# Patient Record
Sex: FEMALE | Race: BLACK OR AFRICAN AMERICAN | URBAN - METROPOLITAN AREA
[De-identification: names, ages, dates, MRNs, and addresses within clinical notes are randomized per-mention and may not be internally consistent; named-entity substitution may affect disease eponyms.]

---

## 2020-07-20 ENCOUNTER — OFFICE VISIT (OUTPATIENT)
Dept: URBAN - METROPOLITAN AREA CLINIC 113 | Facility: CLINIC | Age: 75
End: 2020-07-20

## 2020-07-25 ENCOUNTER — TELEPHONE ENCOUNTER (OUTPATIENT)
Dept: URBAN - METROPOLITAN AREA CLINIC 13 | Facility: CLINIC | Age: 75
End: 2020-07-25

## 2020-07-25 RX ORDER — POTASSIUM CHLORIDE 750 MG/1
TAKE 1 TABLET EVERY OTHER DAY TABLET, FILM COATED, EXTENDED RELEASE ORAL
Refills: 0 | OUTPATIENT
Start: 2012-05-30 | End: 2014-01-22

## 2020-07-25 RX ORDER — LINACLOTIDE 145 UG/1
TAKE 1 CAPSULE DAILY EMPTY STOMACH, 30 MINUTES BEFORE BREAKFAST CAPSULE, GELATIN COATED ORAL
Qty: 30 | Refills: 5 | OUTPATIENT
Start: 2016-12-27 | End: 2020-04-15

## 2020-07-25 RX ORDER — DICYCLOMINE HYDROCHLORIDE 10 MG/1
TAKE 1 CAPSULE BY MOUTH EVERY 4-6 HOURS AS NEEDED FOR ABDOMINAL PAIN CAPSULE ORAL
Qty: 60 | Refills: 2 | OUTPATIENT
Start: 2016-12-08 | End: 2020-04-15

## 2020-07-25 RX ORDER — PIOGLITAZONE 15 MG/1
TAKE 1 TABLET ONCE DAILY TABLET ORAL
Refills: 0 | OUTPATIENT
Start: 2020-01-27 | End: 2020-04-15

## 2020-07-25 RX ORDER — POLYETHYLENE GLYCOL 3350, SODIUM CHLORIDE, SODIUM BICARBONATE AND POTASSIUM CHLORIDE WITH LEMON FLAVOR 420; 11.2; 5.72; 1.48 G/4L; G/4L; G/4L; G/4L
TAKE  ML AS DIRECTED MIX CONTENTS PER DIRECTIONS, BEGIN DRINKING 1/2 SOLUTION @ 5P, COMPLETE REMAINDER OF SOLUTION 6HR PRIOR TO PROCEDURE POWDER, FOR SOLUTION ORAL
Qty: 1 | Refills: 0 | OUTPATIENT
Start: 2012-10-30 | End: 2012-11-06

## 2020-07-25 RX ORDER — TRIAMTERENE AND HYDROCHLOROTHIAZIDE 37.5; 25 MG/1; MG/1
TAKE 1 CAPSULE DAILY CAPSULE ORAL
Refills: 0 | OUTPATIENT
Start: 2011-09-27 | End: 2014-01-22

## 2020-07-25 RX ORDER — AMLODIPINE BESYLATE 10 MG/1
TAKE 1 TABLET DAILY TABLET ORAL
Refills: 0 | OUTPATIENT
Start: 2020-03-01 | End: 2020-04-15

## 2020-07-25 RX ORDER — PRAVASTATIN SODIUM 20 MG/1
TAKE 1 TABLET DAILY TABLET ORAL
Refills: 0 | OUTPATIENT
Start: 2020-03-31 | End: 2020-04-15

## 2020-07-25 RX ORDER — WARFARIN 10 MG/1
TAKE 1 TABLET DAILY TABLET ORAL
Refills: 0 | OUTPATIENT
Start: 2011-09-06 | End: 2014-01-22

## 2020-07-25 RX ORDER — CHOLESTYRAMINE 4 G/9G
TAKE 1 PACKET EVERY 12 HOURS PRN DIARRHEA POWDER, FOR SUSPENSION ORAL
Qty: 60 | Refills: 11 | OUTPATIENT
Start: 2016-12-21 | End: 2016-12-27

## 2020-07-25 RX ORDER — SITAGLIPTIN AND METFORMIN HYDROCHLORIDE 50; 500 MG/1; MG/1
TAKE 1 TABLET DAILY WITH FOOD TABLET, FILM COATED ORAL
Refills: 0 | OUTPATIENT
End: 2016-12-08

## 2020-07-25 RX ORDER — ALPRAZOLAM 0.5 MG/1
TAKE 1 TABLET DAILY AS DIRECTED TABLET ORAL
Refills: 0 | OUTPATIENT
Start: 2020-04-08 | End: 2020-04-15

## 2020-07-25 RX ORDER — SUCRALFATE 1 G/10ML
TAKE ML 4 TIMES DAILY SUSPENSION ORAL
Refills: 0 | OUTPATIENT
Start: 2020-04-08 | End: 2020-05-27

## 2020-07-25 RX ORDER — HYOSCYAMINE SULFATE 0.12 MG/1
PLACE 1 TABLET EVERY 4-6 HOURS PRN ABDOMINAL PAIN TABLET, ORALLY DISINTEGRATING ORAL
Qty: 60 | Refills: 2 | OUTPATIENT
Start: 2014-01-22 | End: 2016-12-08

## 2020-07-25 RX ORDER — TRAMADOL HYDROCHLORIDE AND ACETAMINOPHEN 37.5; 325 MG/1; MG/1
TAKE 1 TO 2 TABLETS EVERY 4 TO 6 HOURS AS NEEDED FOR PAIN TABLET, COATED ORAL
Refills: 0 | OUTPATIENT
End: 2016-12-08

## 2020-07-25 RX ORDER — GLIPIZIDE 5 MG
TAKE 1 TABLET TWICE DAILY TABLET ORAL
Refills: 0 | OUTPATIENT
End: 2020-04-15

## 2020-07-25 RX ORDER — POLYETHYLENE GLYCOL 3350, SODIUM SULFATE, SODIUM CHLORIDE, POTASSIUM CHLORIDE, ASCORBIC ACID, SODIUM ASCORBATE 7.5-2.691G
TAKE 32 OZ AS DIRECTED DRINK ONE BTL STARTING @ 6P AND THE SECOND BTL 5 HR PRIOR TO PROCEDURE KIT ORAL
Qty: 1 | Refills: 0 | OUTPATIENT
Start: 2012-10-30 | End: 2012-11-06

## 2020-07-25 RX ORDER — TRIAMTERENE/HYDROCHLOROTHIAZID 37.5-25 MG
TAKE 1 CAPSULE DAILY CAPSULE ORAL
Refills: 0 | OUTPATIENT
End: 2020-05-27

## 2020-07-25 RX ORDER — GLIPIZIDE 5 MG/1
TAKE 1 TABLET TWICE DAILY TABLET ORAL
Qty: 60 | Refills: 0 | OUTPATIENT
Start: 2010-12-14 | End: 2014-01-22

## 2020-07-25 RX ORDER — LOSARTAN POTASSIUM 50 MG/1
TAKE 1 TABLET DAILY TABLET, FILM COATED ORAL
Refills: 0 | OUTPATIENT
Start: 2020-01-24 | End: 2020-04-15

## 2020-07-25 RX ORDER — AMLODIPINE BESYLATE 5 MG/1
TAKE 1 TABLET DAILY TABLET ORAL
Refills: 0 | OUTPATIENT
Start: 2011-08-22 | End: 2014-01-22

## 2020-07-25 RX ORDER — METHOCARBAMOL 750 MG/1
TAKE 1 TABLET TWICE DAILY AS NEEDED TABLET, FILM COATED ORAL
Refills: 0 | OUTPATIENT
End: 2016-12-08

## 2020-07-25 RX ORDER — COLESEVELAM HYDROCHLORIDE 625 MG/1
TAKE  TABLET TWICE DAILY TABLET, FILM COATED ORAL
Qty: 60 | Refills: 3 | OUTPATIENT
Start: 2016-12-12 | End: 2016-12-27

## 2020-07-25 RX ORDER — PANTOPRAZOLE SODIUM 40 MG/1
TAKE 1 TABLET BY MOUTH ONCE DAILY TABLET, DELAYED RELEASE ORAL
Qty: 30 | Refills: 0 | OUTPATIENT
Start: 2014-01-22 | End: 2016-12-08

## 2020-07-26 ENCOUNTER — TELEPHONE ENCOUNTER (OUTPATIENT)
Dept: URBAN - METROPOLITAN AREA CLINIC 13 | Facility: CLINIC | Age: 75
End: 2020-07-26

## 2020-07-26 RX ORDER — WARFARIN 5 MG/1
TABLET ORAL
Qty: 60 | Refills: 0 | Status: ACTIVE | COMMUNITY
Start: 2010-09-03

## 2020-07-26 RX ORDER — AMLODIPINE BESYLATE 5 MG/1
TABLET ORAL
Qty: 30 | Refills: 0 | Status: ACTIVE | COMMUNITY
Start: 2011-08-22

## 2020-07-26 RX ORDER — LOSARTAN POTASSIUM 50 MG/1
TABLET, FILM COATED ORAL
Qty: 90 | Refills: 0 | Status: ACTIVE | COMMUNITY
Start: 2019-03-03

## 2020-07-26 RX ORDER — WARFARIN 5 MG/1
TABLET ORAL
Qty: 60 | Refills: 0 | Status: ACTIVE | COMMUNITY
Start: 2011-09-06

## 2020-07-26 RX ORDER — METHOCARBAMOL 500 MG/1
TAKE 1 TABLET BY MOUTH EVERY 8 HOURS IF NEEDED TABLET, FILM COATED ORAL
Qty: 15 | Refills: 0 | Status: ACTIVE | COMMUNITY
Start: 2018-07-24

## 2020-07-26 RX ORDER — NEOMYCIN SULFATE, POLYMYXIN B SULFATE AND HYDROCORTISONE 10; 3.5; 1 MG/ML; MG/ML; [USP'U]/ML
SUSPENSION/ DROPS AURICULAR (OTIC)
Qty: 8 | Refills: 0 | Status: ACTIVE | COMMUNITY
Start: 2019-01-25

## 2020-07-26 RX ORDER — TIZANIDINE 4 MG/1
TABLET ORAL
Qty: 60 | Refills: 0 | Status: ACTIVE | COMMUNITY
Start: 2020-01-27

## 2020-07-26 RX ORDER — HYDROCODONE BITARTRATE AND ACETAMINOPHEN 10; 325 MG/1; MG/1
TAKE 1 TABLET EVERY 8 HOURS AS NEEDED FOR PAIN TABLET ORAL
Refills: 0 | Status: ACTIVE | COMMUNITY
Start: 2020-06-12

## 2020-07-26 RX ORDER — POTASSIUM CHLORIDE 750 MG/1
TAKE 1 CAPSULE DAILY CAPSULE, EXTENDED RELEASE ORAL
Refills: 0 | Status: ACTIVE | COMMUNITY

## 2020-07-26 RX ORDER — HYDROCODONE BITARTRATE AND ACETAMINOPHEN 10; 325 MG/1; MG/1
TABLET ORAL
Qty: 30 | Refills: 0 | Status: ACTIVE | COMMUNITY
Start: 2020-04-22

## 2020-07-26 RX ORDER — AMLODIPINE BESYLATE 5 MG/1
TABLET ORAL
Refills: 0 | Status: ACTIVE | COMMUNITY
Start: 2012-01-27

## 2020-07-26 RX ORDER — PIOGLITAZONE 15 MG/1
TABLET ORAL
Qty: 90 | Refills: 0 | Status: ACTIVE | COMMUNITY
Start: 2019-03-04

## 2020-07-26 RX ORDER — FERROUS SULFATE 325(65) MG
TAKE 1 TABLET BY MOUTH ONCE DAILY TABLET ORAL
Qty: 30 | Refills: 0 | Status: ACTIVE | COMMUNITY
Start: 2019-04-23

## 2020-07-26 RX ORDER — OXYCODONE AND ACETAMINOPHEN 5; 325 MG/1; MG/1
TAKE 1 TABLET BY MOUTH EVERY 6 HOURS IF NEEDED FOR PAIN TABLET ORAL
Qty: 12 | Refills: 0 | Status: ACTIVE | COMMUNITY
Start: 2019-07-26

## 2020-07-26 RX ORDER — TRIAMTERENE AND HYDROCHLOROTHIAZIDE 37.5; 25 MG/1; MG/1
TABLET ORAL
Qty: 30 | Refills: 0 | Status: ACTIVE | COMMUNITY
Start: 2011-03-22

## 2020-07-26 RX ORDER — ZOLPIDEM TARTRATE 5 MG/1
TAKE 1 TABLET BY MOUTH IF NEEDED FOR SLEEP TABLET, FILM COATED ORAL
Qty: 30 | Refills: 0 | Status: ACTIVE | COMMUNITY
Start: 2012-03-22

## 2020-07-26 RX ORDER — METOPROLOL SUCCINATE 50 MG/1
TAKE 1 TABLET DAILY TABLET, EXTENDED RELEASE ORAL
Refills: 0 | Status: ACTIVE | COMMUNITY

## 2020-07-26 RX ORDER — OMEPRAZOLE 20 MG/1
CAPSULE, DELAYED RELEASE ORAL
Qty: 30 | Refills: 0 | Status: ACTIVE | COMMUNITY
Start: 2011-01-13

## 2020-07-26 RX ORDER — TRAMADOL HYDROCHLORIDE AND ACETAMINOPHEN 37.5; 325 MG/1; MG/1
TABLET, FILM COATED ORAL
Qty: 90 | Refills: 0 | Status: ACTIVE | COMMUNITY
Start: 2011-01-13

## 2020-07-26 RX ORDER — CLOTRIMAZOLE AND BETAMETHASONE DIPROPIONATE 10; .5 MG/G; MG/G
APPLY TO AFFECTED AREA ONCE DAILY FOR 7 DAYS AS DIRECTED CREAM TOPICAL
Qty: 15 | Refills: 0 | Status: ACTIVE | COMMUNITY
Start: 2012-07-19

## 2020-07-26 RX ORDER — POTASSIUM CHLORIDE 750 MG/1
TABLET, EXTENDED RELEASE ORAL
Qty: 60 | Refills: 0 | Status: ACTIVE | COMMUNITY
Start: 2011-07-06

## 2020-07-26 RX ORDER — CIPROFLOXACIN HYDROCHLORIDE 500 MG/1
TAKE 1 TABLET BY MOUTH TWICE A DAY TABLET, FILM COATED ORAL
Qty: 20 | Refills: 0 | Status: ACTIVE | COMMUNITY
Start: 2018-12-13

## 2020-07-26 RX ORDER — AMOXICILLIN AND CLAVULANATE POTASSIUM 875; 125 MG/1; MG/1
TAKE 1 TABLET BY MOUTH TWICE A DAY FOR 10 DAYS TABLET, FILM COATED ORAL
Qty: 20 | Refills: 0 | Status: ACTIVE | COMMUNITY
Start: 2012-09-17

## 2020-07-26 RX ORDER — METRONIDAZOLE 500 MG/1
TAKE 1 TABLET BY MOUTH TWICE A DAY TABLET ORAL
Qty: 20 | Refills: 0 | Status: ACTIVE | COMMUNITY
Start: 2018-12-13

## 2020-07-26 RX ORDER — ONDANSETRON HYDROCHLORIDE 4 MG/1
TABLET, FILM COATED ORAL
Qty: 60 | Refills: 0 | Status: ACTIVE | COMMUNITY
Start: 2018-01-15

## 2020-07-26 RX ORDER — OSELTAMIVIR PHOSPHATE 75 MG/1
CAPSULE ORAL
Qty: 10 | Refills: 0 | Status: ACTIVE | COMMUNITY
Start: 2020-02-01

## 2020-07-26 RX ORDER — HYDROCODONE BITARTRATE AND ACETAMINOPHEN 10; 325 MG/1; MG/1
TABLET ORAL
Qty: 21 | Refills: 0 | Status: ACTIVE | COMMUNITY
Start: 2019-04-12

## 2020-07-26 RX ORDER — OSELTAMIVIR PHOSPHATE 75 MG/1
CAPSULE ORAL
Qty: 10 | Refills: 0 | Status: ACTIVE | COMMUNITY
Start: 2020-03-01

## 2020-07-26 RX ORDER — TRIAMTERENE AND HYDROCHLOROTHIAZIDE 37.5; 25 MG/1; MG/1
CAPSULE ORAL
Qty: 30 | Refills: 0 | Status: ACTIVE | COMMUNITY
Start: 2011-09-27

## 2020-07-26 RX ORDER — FLUCONAZOLE 150 MG/1
TABLET ORAL
Qty: 3 | Refills: 0 | Status: ACTIVE | COMMUNITY
Start: 2020-03-01

## 2020-07-26 RX ORDER — HYDROCHLOROTHIAZIDE 25 MG/1
TAKE 1 TABLET DAILY TABLET ORAL
Refills: 0 | Status: ACTIVE | COMMUNITY
Start: 2020-06-12

## 2020-07-26 RX ORDER — IRON/C/B12/CALCIU/STOMACH CONC 70-150-10
TABLET ORAL
Qty: 30 | Refills: 0 | Status: ACTIVE | COMMUNITY
Start: 2018-10-24

## 2020-07-26 RX ORDER — CHLORHEXIDINE GLUCONATE 4 %
TAKE 1 TABLET BY MOUTH DAILY LIQUID (ML) TOPICAL
Qty: 30 | Refills: 0 | Status: ACTIVE | COMMUNITY
Start: 2019-04-23

## 2020-07-26 RX ORDER — AMLODIPINE BESYLATE 5 MG
TAKE 1 TABLET DAILY TABLET ORAL
Refills: 0 | Status: ACTIVE | COMMUNITY

## 2020-07-26 RX ORDER — AZITHROMYCIN DIHYDRATE 250 MG/1
TABLET, FILM COATED ORAL
Qty: 6 | Refills: 0 | Status: ACTIVE | COMMUNITY
Start: 2020-04-05

## 2020-07-26 RX ORDER — ZOLPIDEM TARTRATE 10 MG/1
TAKE 1 TABLET BY MOUTH AT BEDTIME IF NEEDED FOR SLEEP TABLET, FILM COATED ORAL
Qty: 30 | Refills: 0 | Status: ACTIVE | COMMUNITY
Start: 2011-07-01

## 2020-07-26 RX ORDER — LEVETIRACETAM 500 MG/1
TAKE 1 TABLET BY MOUTH TWICE A DAY TABLET ORAL
Qty: 60 | Refills: 0 | Status: ACTIVE | COMMUNITY
Start: 2019-11-25

## 2020-07-26 RX ORDER — TIZANIDINE 4 MG/1
TAKE 1-2 TABLET BY MOUTH AS NEEDED FOR CRAMPS/ PAIN MAX TWICE DAILY TABLET ORAL
Qty: 60 | Refills: 0 | Status: ACTIVE | COMMUNITY
Start: 2019-11-25

## 2020-07-26 RX ORDER — ZOLPIDEM TARTRATE 10 MG/1
TAKE 1 TABLET BY MOUTH AT BEDTIME AS NEEDED FOR SLEEP TABLET, FILM COATED ORAL
Qty: 30 | Refills: 0 | Status: ACTIVE | COMMUNITY
Start: 2011-12-02

## 2020-07-26 RX ORDER — AZITHROMYCIN DIHYDRATE 500 MG/1
TAKE 4 TABLETS AS 1 DOSE TABLET, FILM COATED ORAL
Qty: 4 | Refills: 0 | Status: ACTIVE | COMMUNITY
Start: 2011-10-05

## 2020-07-26 RX ORDER — AMLODIPINE BESYLATE 10 MG/1
TABLET ORAL
Qty: 30 | Refills: 0 | Status: ACTIVE | COMMUNITY
Start: 2019-03-03

## 2020-07-26 RX ORDER — PREDNISONE 20 MG/1
TAKE 40MG BY MOUTH DAILY FOR 2 DAYS, THEN 20MG DAILY FOR 2 DAYS, THEN TABLET ORAL
Qty: 7 | Refills: 0 | Status: ACTIVE | COMMUNITY
Start: 2018-07-14

## 2020-07-26 RX ORDER — HYDROCODONE BITARTRATE AND ACETAMINOPHEN 10; 325 MG/1; MG/1
TABLET ORAL
Qty: 20 | Refills: 0 | Status: ACTIVE | COMMUNITY
Start: 2020-03-22

## 2020-07-26 RX ORDER — TIZANIDINE 4 MG/1
TABLET ORAL
Qty: 60 | Refills: 0 | Status: ACTIVE | COMMUNITY
Start: 2020-04-16

## 2020-07-26 RX ORDER — METHYLPREDNISOLONE 4 MG/1
TABLET ORAL
Qty: 21 | Refills: 0 | Status: ACTIVE | COMMUNITY
Start: 2020-02-01

## 2020-07-26 RX ORDER — DICLOFENAC SODIUM 1 %
APPLY 2 GRAMS TO AFFECTED AREA THREE TIMES A DAY GEL (GRAM) TOPICAL
Qty: 255 | Refills: 0 | Status: ACTIVE | COMMUNITY
Start: 2012-05-29

## 2020-07-26 RX ORDER — HYDROCODONE BITARTRATE AND ACETAMINOPHEN 10; 325 MG/1; MG/1
TK 1 T PO  PRF SEVERE P MAX BID TABLET ORAL
Qty: 20 | Refills: 0 | Status: ACTIVE | COMMUNITY
Start: 2020-03-16

## 2020-07-26 RX ORDER — AMLODIPINE BESYLATE 5 MG/1
TABLET ORAL
Qty: 30 | Refills: 0 | Status: ACTIVE | COMMUNITY
Start: 2011-01-10

## 2020-07-26 RX ORDER — CETIRIZINE HYDROCHLORIDE, PSEUDOEPHEDRINE HYDROCHLORIDE 5; 120 MG/1; MG/1
TAKE 2 TABLETS BY MOUTH TWICE A DAY TABLET, FILM COATED, EXTENDED RELEASE ORAL
Qty: 60 | Refills: 0 | Status: ACTIVE | COMMUNITY
Start: 2019-12-16

## 2020-07-26 RX ORDER — ALPRAZOLAM 0.5 MG/1
TABLET ORAL
Qty: 40 | Refills: 0 | Status: ACTIVE | COMMUNITY
Start: 2019-03-04

## 2020-07-26 RX ORDER — FLUCONAZOLE 150 MG/1
TAKE 1 TABLET BY MOUTH FOR INFECTION WAIT THREE DAYS MAY REPEAT TWO MO TABLET ORAL
Qty: 3 | Refills: 0 | Status: ACTIVE | COMMUNITY
Start: 2019-12-26

## 2020-07-26 RX ORDER — GLIPIZIDE 5 MG/1
TABLET ORAL
Qty: 60 | Refills: 0 | Status: ACTIVE | COMMUNITY
Start: 2010-12-14

## 2020-07-26 RX ORDER — OSELTAMIVIR PHOSPHATE 75 MG/1
CAPSULE ORAL
Qty: 10 | Refills: 0 | Status: ACTIVE | COMMUNITY
Start: 2019-05-05

## 2020-07-26 RX ORDER — FLUCONAZOLE 100 MG/1
TABLET ORAL
Refills: 0 | Status: ACTIVE | COMMUNITY
Start: 2012-07-19

## 2020-07-26 RX ORDER — HYOSCYAMINE SULFATE 0.12 MG/1
PLACE 1 TABLET EVERY 4-6 HOURS PRN ABDOMINAL PAIN TABLET, ORALLY DISINTEGRATING ORAL
Qty: 60 | Refills: 2 | Status: ACTIVE | COMMUNITY
Start: 2020-04-15

## 2020-07-26 RX ORDER — BROMPHENIRAMINE MALEATE, PSEUDOEPHEDRINE HYDROCHLORIDE, 2; 30; 10 MG/5ML; MG/5ML; MG/5ML
TAKE 5 TO 10 MILLILITERS BY MOUTH THREE TIMES A DAY IF NEEDED SYRUP ORAL
Qty: 180 | Refills: 0 | Status: ACTIVE | COMMUNITY
Start: 2019-05-05

## 2020-07-26 RX ORDER — OXYCODONE AND ACETAMINOPHEN 5; 325 MG/1; MG/1
TAKE 1 TABLET BY MOUTH EVERY 4 HOURS IF NEEDED FOR PAIN TABLET ORAL
Qty: 12 | Refills: 0 | Status: ACTIVE | COMMUNITY
Start: 2018-09-01

## 2020-07-26 RX ORDER — PIOGLITAZONE HCL 15 MG
TAKE 1 TABLET BY MOUTH ONCE DAILY TABLET ORAL
Qty: 30 | Refills: 0 | Status: ACTIVE | COMMUNITY
Start: 2011-07-01

## 2020-07-26 RX ORDER — ENOXAPARIN SODIUM 100 MG/ML
INJECTION SUBCUTANEOUS
Qty: 5 | Refills: 0 | Status: ACTIVE | COMMUNITY
Start: 2012-11-01

## 2020-07-26 RX ORDER — HYOSCYAMINE SULFATE 0.12 MG/1
DISSOLVE 1 TABLET UNDER THE TONGUE EVERY 4 TO 6 HOURS AS NEEDED FOR ABDOMINAL PAIN TABLET, ORALLY DISINTEGRATING ORAL
Qty: 45 | Refills: 2 | Status: ACTIVE | COMMUNITY
Start: 2020-04-15

## 2020-10-27 ENCOUNTER — TELEPHONE ENCOUNTER (OUTPATIENT)
Dept: URBAN - METROPOLITAN AREA CLINIC 113 | Facility: CLINIC | Age: 75
End: 2020-10-27

## 2020-12-02 ENCOUNTER — WEB ENCOUNTER (OUTPATIENT)
Dept: URBAN - METROPOLITAN AREA CLINIC 113 | Facility: CLINIC | Age: 75
End: 2020-12-02

## 2020-12-02 ENCOUNTER — TELEPHONE ENCOUNTER (OUTPATIENT)
Dept: URBAN - METROPOLITAN AREA CLINIC 113 | Facility: CLINIC | Age: 75
End: 2020-12-02

## 2020-12-02 ENCOUNTER — OFFICE VISIT (OUTPATIENT)
Dept: URBAN - METROPOLITAN AREA CLINIC 113 | Facility: CLINIC | Age: 75
End: 2020-12-02
Payer: COMMERCIAL

## 2020-12-02 VITALS
DIASTOLIC BLOOD PRESSURE: 75 MMHG | WEIGHT: 182 LBS | HEART RATE: 67 BPM | SYSTOLIC BLOOD PRESSURE: 148 MMHG | HEIGHT: 67 IN | BODY MASS INDEX: 28.56 KG/M2 | TEMPERATURE: 96.9 F

## 2020-12-02 DIAGNOSIS — K55.20 ANGIODYSPLASIA: ICD-10-CM

## 2020-12-02 DIAGNOSIS — R10.10 UPPER ABDOMINAL PAIN: ICD-10-CM

## 2020-12-02 DIAGNOSIS — K29.50 OTHER CHRONIC GASTRITIS WITHOUT HEMORRHAGE: ICD-10-CM

## 2020-12-02 PROBLEM — 8493009: Status: ACTIVE | Noted: 2020-12-02

## 2020-12-02 PROCEDURE — G8427 DOCREV CUR MEDS BY ELIG CLIN: HCPCS | Performed by: INTERNAL MEDICINE

## 2020-12-02 PROCEDURE — 99214 OFFICE O/P EST MOD 30 MIN: CPT | Performed by: INTERNAL MEDICINE

## 2020-12-02 PROCEDURE — G8417 CALC BMI ABV UP PARAM F/U: HCPCS | Performed by: INTERNAL MEDICINE

## 2020-12-02 PROCEDURE — G9903 PT SCRN TBCO ID AS NON USER: HCPCS | Performed by: INTERNAL MEDICINE

## 2020-12-02 RX ORDER — BROMPHENIRAMINE MALEATE, PSEUDOEPHEDRINE HYDROCHLORIDE, 2; 30; 10 MG/5ML; MG/5ML; MG/5ML
TAKE 5 TO 10 MILLILITERS BY MOUTH THREE TIMES A DAY IF NEEDED SYRUP ORAL
Qty: 180 | Refills: 0 | Status: ON HOLD | COMMUNITY
Start: 2019-05-05

## 2020-12-02 RX ORDER — AMLODIPINE BESYLATE 10 MG/1
TABLET ORAL
Qty: 30 | Refills: 0 | Status: ON HOLD | COMMUNITY
Start: 2019-03-03

## 2020-12-02 RX ORDER — HYDROCHLOROTHIAZIDE 25 MG/1
TAKE 1 TABLET DAILY TABLET ORAL
Refills: 0 | Status: ON HOLD | COMMUNITY
Start: 2020-06-12

## 2020-12-02 RX ORDER — ASPIRIN 81 MG/1
1 TABLET TABLET ORAL ONCE A DAY
Status: ACTIVE | COMMUNITY

## 2020-12-02 RX ORDER — PANTOPRAZOLE SODIUM 40 MG/1
1 TABLET TABLET, DELAYED RELEASE ORAL TWICE A DAY
Status: ACTIVE | COMMUNITY

## 2020-12-02 RX ORDER — ZOLPIDEM TARTRATE 5 MG/1
TAKE 1 TABLET BY MOUTH IF NEEDED FOR SLEEP TABLET, FILM COATED ORAL
Qty: 30 | Refills: 0 | Status: ON HOLD | COMMUNITY
Start: 2012-03-22

## 2020-12-02 RX ORDER — SUCRALFATE 1 G/10ML
10 ML ON AN EMPTY STOMACH PRN SUSPENSION ORAL
Status: ACTIVE | COMMUNITY

## 2020-12-02 RX ORDER — ALPRAZOLAM 0.5 MG/1
TABLET ORAL
Qty: 40 | Refills: 0 | Status: ACTIVE | COMMUNITY
Start: 2019-03-04

## 2020-12-02 RX ORDER — NEOMYCIN SULFATE, POLYMYXIN B SULFATE AND HYDROCORTISONE 10; 3.5; 1 MG/ML; MG/ML; [USP'U]/ML
SUSPENSION/ DROPS AURICULAR (OTIC)
Qty: 8 | Refills: 0 | Status: ON HOLD | COMMUNITY
Start: 2019-01-25

## 2020-12-02 RX ORDER — PREDNISONE 20 MG/1
TAKE 40MG BY MOUTH DAILY FOR 2 DAYS, THEN 20MG DAILY FOR 2 DAYS, THEN TABLET ORAL
Qty: 7 | Refills: 0 | Status: ON HOLD | COMMUNITY
Start: 2018-07-14

## 2020-12-02 RX ORDER — LEVETIRACETAM 500 MG/1
TAKE 1 TABLET BY MOUTH TWICE A DAY TABLET ORAL
Qty: 60 | Refills: 0 | Status: ON HOLD | COMMUNITY
Start: 2019-11-25

## 2020-12-02 RX ORDER — GLIPIZIDE 5 MG/1
TABLET ORAL
Qty: 60 | Refills: 0 | Status: ON HOLD | COMMUNITY
Start: 2010-12-14

## 2020-12-02 RX ORDER — OSELTAMIVIR PHOSPHATE 75 MG/1
CAPSULE ORAL
Qty: 10 | Refills: 0 | Status: ON HOLD | COMMUNITY
Start: 2019-05-05

## 2020-12-02 RX ORDER — WARFARIN 5 MG/1
TABLET ORAL
Qty: 60 | Refills: 0 | Status: ACTIVE | COMMUNITY
Start: 2010-09-03

## 2020-12-02 RX ORDER — TRIAMTERENE AND HYDROCHLOROTHIAZIDE 37.5; 25 MG/1; MG/1
CAPSULE ORAL
Qty: 30 | Refills: 0 | Status: ON HOLD | COMMUNITY
Start: 2011-09-27

## 2020-12-02 RX ORDER — TRAMADOL HYDROCHLORIDE AND ACETAMINOPHEN 37.5; 325 MG/1; MG/1
TABLET, FILM COATED ORAL
Qty: 90 | Refills: 0 | Status: ON HOLD | COMMUNITY
Start: 2011-01-13

## 2020-12-02 RX ORDER — AMLODIPINE BESYLATE 5 MG
TAKE 1 TABLET DAILY TABLET ORAL
Refills: 0 | Status: ON HOLD | COMMUNITY

## 2020-12-02 RX ORDER — PIOGLITAZONE 15 MG/1
TABLET ORAL
Qty: 90 | Refills: 0 | Status: ACTIVE | COMMUNITY
Start: 2019-03-04

## 2020-12-02 RX ORDER — METOPROLOL SUCCINATE 50 MG/1
TAKE 1 TABLET DAILY TABLET, EXTENDED RELEASE ORAL
Refills: 0 | Status: ACTIVE | COMMUNITY

## 2020-12-02 RX ORDER — WARFARIN SODIUM 10 MG/1
1 TABLET TABLET ORAL ONCE A DAY
Status: ACTIVE | COMMUNITY

## 2020-12-02 RX ORDER — METHYLPREDNISOLONE 4 MG/1
TABLET ORAL
Qty: 21 | Refills: 0 | Status: ON HOLD | COMMUNITY
Start: 2020-02-01

## 2020-12-02 RX ORDER — LOSARTAN POTASSIUM 50 MG/1
TABLET, FILM COATED ORAL
Qty: 90 | Refills: 0 | Status: ACTIVE | COMMUNITY
Start: 2019-03-03

## 2020-12-02 RX ORDER — METFORMIN HYDROCHLORIDE 500 MG/1
1 TABLET WITH A MEAL TABLET, FILM COATED ORAL TWICE DAILY
Status: ACTIVE | COMMUNITY

## 2020-12-02 RX ORDER — METHOCARBAMOL 500 MG/1
TAKE 1 TABLET BY MOUTH EVERY 8 HOURS IF NEEDED TABLET, FILM COATED ORAL
Qty: 15 | Refills: 0 | Status: ON HOLD | COMMUNITY
Start: 2018-07-24

## 2020-12-02 RX ORDER — TIZANIDINE 4 MG/1
TAKE 1-2 TABLET BY MOUTH AS NEEDED FOR CRAMPS/ PAIN MAX TWICE DAILY TABLET ORAL
Qty: 60 | Refills: 0 | Status: ACTIVE | COMMUNITY
Start: 2019-11-25

## 2020-12-02 RX ORDER — PIOGLITAZONE HCL 15 MG
TAKE 1 TABLET BY MOUTH ONCE DAILY TABLET ORAL
Qty: 30 | Refills: 0 | Status: ON HOLD | COMMUNITY
Start: 2011-07-01

## 2020-12-02 RX ORDER — TRIAMTERENE AND HYDROCHLOROTHIAZIDE 37.5; 25 MG/1; MG/1
TABLET ORAL
Qty: 30 | Refills: 0 | Status: ON HOLD | COMMUNITY
Start: 2011-03-22

## 2020-12-02 RX ORDER — FLUCONAZOLE 100 MG/1
TABLET ORAL
Refills: 0 | Status: ON HOLD | COMMUNITY
Start: 2012-07-19

## 2020-12-02 RX ORDER — IRON/C/B12/CALCIU/STOMACH CONC 70-150-10
TABLET ORAL
Qty: 30 | Refills: 0 | Status: ON HOLD | COMMUNITY
Start: 2018-10-24

## 2020-12-02 RX ORDER — ONDANSETRON HYDROCHLORIDE 4 MG/1
TABLET, FILM COATED ORAL
Qty: 60 | Refills: 0 | Status: ON HOLD | COMMUNITY
Start: 2018-01-15

## 2020-12-02 RX ORDER — AMOXICILLIN AND CLAVULANATE POTASSIUM 875; 125 MG/1; MG/1
TAKE 1 TABLET BY MOUTH TWICE A DAY FOR 10 DAYS TABLET, FILM COATED ORAL
Qty: 20 | Refills: 0 | Status: ON HOLD | COMMUNITY
Start: 2012-09-17

## 2020-12-02 RX ORDER — OXYCODONE AND ACETAMINOPHEN 5; 325 MG/1; MG/1
TAKE 1 TABLET BY MOUTH EVERY 4 HOURS IF NEEDED FOR PAIN TABLET ORAL
Qty: 12 | Refills: 0 | Status: ON HOLD | COMMUNITY
Start: 2018-09-01

## 2020-12-02 RX ORDER — POTASSIUM CHLORIDE 750 MG/1
TAKE 1 CAPSULE DAILY CAPSULE, EXTENDED RELEASE ORAL
Refills: 0 | Status: ON HOLD | COMMUNITY

## 2020-12-02 RX ORDER — AZITHROMYCIN DIHYDRATE 500 MG/1
TAKE 4 TABLETS AS 1 DOSE TABLET, FILM COATED ORAL
Qty: 4 | Refills: 0 | Status: ON HOLD | COMMUNITY
Start: 2011-10-05

## 2020-12-02 RX ORDER — CHLORHEXIDINE GLUCONATE 4 %
TAKE 1 TABLET BY MOUTH DAILY LIQUID (ML) TOPICAL
Qty: 30 | Refills: 0 | Status: ON HOLD | COMMUNITY
Start: 2019-04-23

## 2020-12-02 RX ORDER — POTASSIUM CHLORIDE 750 MG/1
TABLET, EXTENDED RELEASE ORAL
Qty: 60 | Refills: 0 | Status: ON HOLD | COMMUNITY
Start: 2011-07-06

## 2020-12-02 RX ORDER — OMEPRAZOLE 20 MG/1
CAPSULE, DELAYED RELEASE ORAL
Qty: 30 | Refills: 0 | Status: ON HOLD | COMMUNITY
Start: 2011-01-13

## 2020-12-02 RX ORDER — CIPROFLOXACIN HYDROCHLORIDE 500 MG/1
TAKE 1 TABLET BY MOUTH TWICE A DAY TABLET, FILM COATED ORAL
Qty: 20 | Refills: 0 | Status: ON HOLD | COMMUNITY
Start: 2018-12-13

## 2020-12-02 RX ORDER — CLOTRIMAZOLE AND BETAMETHASONE DIPROPIONATE 10; .5 MG/G; MG/G
APPLY TO AFFECTED AREA ONCE DAILY FOR 7 DAYS AS DIRECTED CREAM TOPICAL
Qty: 15 | Refills: 0 | Status: ON HOLD | COMMUNITY
Start: 2012-07-19

## 2020-12-02 RX ORDER — HYOSCYAMINE SULFATE 0.12 MG/1
PLACE 1 TABLET EVERY 4-6 HOURS PRN ABDOMINAL PAIN TABLET, ORALLY DISINTEGRATING ORAL
Qty: 60 | Refills: 2 | Status: ON HOLD | COMMUNITY
Start: 2020-04-15

## 2020-12-02 RX ORDER — DICLOFENAC SODIUM 1 %
APPLY 2 GRAMS TO AFFECTED AREA THREE TIMES A DAY GEL (GRAM) TOPICAL
Qty: 255 | Refills: 0 | Status: ON HOLD | COMMUNITY
Start: 2012-05-29

## 2020-12-02 RX ORDER — METRONIDAZOLE 500 MG/1
TAKE 1 TABLET BY MOUTH TWICE A DAY TABLET ORAL
Qty: 20 | Refills: 0 | Status: ON HOLD | COMMUNITY
Start: 2018-12-13

## 2020-12-02 RX ORDER — ZOLPIDEM TARTRATE 10 MG/1
TAKE 1 TABLET BY MOUTH AT BEDTIME IF NEEDED FOR SLEEP TABLET, FILM COATED ORAL
Qty: 30 | Refills: 0 | Status: ON HOLD | COMMUNITY
Start: 2011-07-01

## 2020-12-02 RX ORDER — AZITHROMYCIN DIHYDRATE 250 MG/1
TABLET, FILM COATED ORAL
Qty: 6 | Refills: 0 | Status: ACTIVE | COMMUNITY
Start: 2020-04-05

## 2020-12-02 RX ORDER — HYOSCYAMINE SULFATE 0.12 MG/1
DISSOLVE 1 TABLET UNDER THE TONGUE EVERY 4 TO 6 HOURS AS NEEDED FOR ABDOMINAL PAIN TABLET, ORALLY DISINTEGRATING ORAL
Qty: 45 | Refills: 2 | Status: ACTIVE | COMMUNITY
Start: 2020-04-15

## 2020-12-02 RX ORDER — FLUCONAZOLE 150 MG/1
TAKE 1 TABLET BY MOUTH FOR INFECTION WAIT THREE DAYS MAY REPEAT TWO MO TABLET ORAL
Qty: 3 | Refills: 0 | Status: ON HOLD | COMMUNITY
Start: 2019-12-26

## 2020-12-02 RX ORDER — ENOXAPARIN SODIUM 100 MG/ML
INJECTION SUBCUTANEOUS
Qty: 5 | Refills: 0 | Status: ON HOLD | COMMUNITY
Start: 2012-11-01

## 2020-12-02 RX ORDER — HYDROCODONE BITARTRATE AND ACETAMINOPHEN 10; 325 MG/1; MG/1
TABLET ORAL
Qty: 21 | Refills: 0 | Status: ACTIVE | COMMUNITY
Start: 2019-04-12

## 2020-12-02 RX ORDER — FERRIC CARBOXYMALTOSE INJECTION 50 MG/ML
AS DIRECTED INJECTION, SOLUTION INTRAVENOUS
Status: ACTIVE | COMMUNITY

## 2020-12-02 RX ORDER — FERROUS SULFATE 325(65) MG
TAKE 1 TABLET BY MOUTH ONCE DAILY TABLET ORAL
Qty: 30 | Refills: 0 | Status: ON HOLD | COMMUNITY
Start: 2019-04-23

## 2020-12-02 RX ORDER — AMLODIPINE BESYLATE 5 MG/1
TABLET ORAL
Qty: 30 | Refills: 0 | Status: ACTIVE | COMMUNITY
Start: 2011-01-10

## 2020-12-02 RX ORDER — PRAVASTATIN SODIUM 20 MG/1
1 TABLET TABLET ORAL ONCE A DAY
Status: ACTIVE | COMMUNITY

## 2020-12-02 RX ORDER — CETIRIZINE HYDROCHLORIDE, PSEUDOEPHEDRINE HYDROCHLORIDE 5; 120 MG/1; MG/1
TAKE 2 TABLETS BY MOUTH TWICE A DAY TABLET, FILM COATED, EXTENDED RELEASE ORAL
Qty: 60 | Refills: 0 | Status: ON HOLD | COMMUNITY
Start: 2019-12-16

## 2020-12-02 NOTE — HPI-TODAY'S VISIT:
Ms. 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  She was last seen May 19th.  Previous EGD 5/27/20 revealed a medium-sized hiatal hernia, otherwise normal esophagus, nonbleeding angioctasia in the gastric body which was coagulated with the bipolar probe, otherwise normal stomach, medium sized angioctasia in the fourth portion of duodenum status post bipolar cautery, otherwise normal duodenum.  She had labs with her PCP last month and was told it was normal.  She had an ultrasound at [facility] last week and was told it was normal.  She continues to have upper abdominal pain. It is exacerbated when moving or laying down.  It is unaffected by eating and not relieved with defecation.  She feels very full after eating.   SHe denies abdominal pain, nausea, vomiting, change in bowel habits, blood in the stool or weight loss.  She takes hydrocodone 4-5 times a week.  Recent CT abdomen and pelvis with contrast 4/21/20 with no acute pathology, gallbladder was surgically absent, pancreas was normal, on diverticulosis without diverticulitis, moderate fat-containing right inguinal hernia, degenerative changes of the spine.    Labs  4/8/20: BUN 29, creatinine 1.55, AST 15, ALT 12, alkaline phosphatase 137, T bili 0.2, abdomen 4.3, amylase 126, lipase 81; PT 21, INR 2   Gastric emptying study 12/22/16: Normal gastric emptying for solids, 75% emptying of stomach and 60 minutes. KUB 11/12/16: minimal stool, otherwise normal. CTAP with contrast 10/27/16: small hiatal hernia, status post cholecystectomy, small cortical renal cyst and left colonic/sigmoid diverticulosis, mild lumbar spondylosis.   Colonoscopy 11/6/12: diverticulosis in sigmoid colon, transverse colon, and ascending colon.

## 2021-07-19 ENCOUNTER — TELEPHONE ENCOUNTER (OUTPATIENT)
Dept: URBAN - METROPOLITAN AREA CLINIC 113 | Facility: CLINIC | Age: 76
End: 2021-07-19

## 2021-08-13 ENCOUNTER — LAB OUTSIDE AN ENCOUNTER (OUTPATIENT)
Dept: URBAN - METROPOLITAN AREA CLINIC 113 | Facility: CLINIC | Age: 76
End: 2021-08-13

## 2021-08-13 ENCOUNTER — WEB ENCOUNTER (OUTPATIENT)
Dept: URBAN - METROPOLITAN AREA CLINIC 113 | Facility: CLINIC | Age: 76
End: 2021-08-13

## 2021-08-13 ENCOUNTER — OFFICE VISIT (OUTPATIENT)
Dept: URBAN - METROPOLITAN AREA CLINIC 113 | Facility: CLINIC | Age: 76
End: 2021-08-13
Payer: COMMERCIAL

## 2021-08-13 ENCOUNTER — TELEPHONE ENCOUNTER (OUTPATIENT)
Dept: URBAN - METROPOLITAN AREA CLINIC 113 | Facility: CLINIC | Age: 76
End: 2021-08-13

## 2021-08-13 VITALS
BODY MASS INDEX: 27.78 KG/M2 | TEMPERATURE: 97.1 F | HEIGHT: 67 IN | HEART RATE: 58 BPM | RESPIRATION RATE: 20 BRPM | SYSTOLIC BLOOD PRESSURE: 161 MMHG | WEIGHT: 177 LBS | DIASTOLIC BLOOD PRESSURE: 66 MMHG

## 2021-08-13 DIAGNOSIS — R63.4 WEIGHT LOSS: ICD-10-CM

## 2021-08-13 DIAGNOSIS — K55.20 ANGIODYSPLASIA: ICD-10-CM

## 2021-08-13 DIAGNOSIS — K59.01 CONSTIPATION BY DELAYED COLONIC TRANSIT: ICD-10-CM

## 2021-08-13 DIAGNOSIS — R10.10 UPPER ABDOMINAL PAIN: ICD-10-CM

## 2021-08-13 PROCEDURE — 99214 OFFICE O/P EST MOD 30 MIN: CPT | Performed by: INTERNAL MEDICINE

## 2021-08-13 RX ORDER — AZITHROMYCIN DIHYDRATE 500 MG/1
TAKE 4 TABLETS AS 1 DOSE TABLET, FILM COATED ORAL
Qty: 4 | Refills: 0 | Status: ON HOLD | COMMUNITY
Start: 2011-10-05

## 2021-08-13 RX ORDER — TIZANIDINE 4 MG/1
TAKE 1-2 TABLET BY MOUTH AS NEEDED FOR CRAMPS/ PAIN MAX TWICE DAILY TABLET ORAL
Qty: 60 | Refills: 0 | Status: ACTIVE | COMMUNITY
Start: 2019-11-25

## 2021-08-13 RX ORDER — AMLODIPINE BESYLATE 5 MG/1
1 TABLET TABLET ORAL ONCE A DAY
Refills: 0 | Status: ACTIVE | COMMUNITY
Start: 2011-01-10

## 2021-08-13 RX ORDER — CHLORHEXIDINE GLUCONATE 4 %
TAKE 1 TABLET BY MOUTH DAILY LIQUID (ML) TOPICAL
Qty: 30 | Refills: 0 | Status: ON HOLD | COMMUNITY
Start: 2019-04-23

## 2021-08-13 RX ORDER — POTASSIUM CHLORIDE 750 MG/1
TABLET, EXTENDED RELEASE ORAL
Qty: 60 | Refills: 0 | Status: ON HOLD | COMMUNITY
Start: 2011-07-06

## 2021-08-13 RX ORDER — ALPRAZOLAM 0.5 MG/1
1 TABLET TABLET ORAL ONCE A DAY
Refills: 0 | Status: ACTIVE | COMMUNITY
Start: 2019-03-04

## 2021-08-13 RX ORDER — WARFARIN 5 MG/1
TABLET ORAL
Qty: 60 | Refills: 0 | Status: ACTIVE | COMMUNITY
Start: 2010-09-03

## 2021-08-13 RX ORDER — PANTOPRAZOLE SODIUM 40 MG/1
1 TABLET TABLET, DELAYED RELEASE ORAL TWICE A DAY
Status: ACTIVE | COMMUNITY

## 2021-08-13 RX ORDER — TRAMADOL HYDROCHLORIDE AND ACETAMINOPHEN 37.5; 325 MG/1; MG/1
TABLET, FILM COATED ORAL
Qty: 90 | Refills: 0 | Status: ON HOLD | COMMUNITY
Start: 2011-01-13

## 2021-08-13 RX ORDER — LOSARTAN POTASSIUM 50 MG/1
1 TABLET TABLET, FILM COATED ORAL ONCE A DAY
Refills: 0 | Status: ACTIVE | COMMUNITY
Start: 2019-03-03

## 2021-08-13 RX ORDER — AMOXICILLIN AND CLAVULANATE POTASSIUM 875; 125 MG/1; MG/1
TAKE 1 TABLET BY MOUTH TWICE A DAY FOR 10 DAYS TABLET, FILM COATED ORAL
Qty: 20 | Refills: 0 | Status: ON HOLD | COMMUNITY
Start: 2012-09-17

## 2021-08-13 RX ORDER — POTASSIUM CHLORIDE 750 MG/1
TAKE 1 CAPSULE DAILY CAPSULE, EXTENDED RELEASE ORAL
Refills: 0 | Status: ON HOLD | COMMUNITY

## 2021-08-13 RX ORDER — PREDNISONE 20 MG/1
TAKE 40MG BY MOUTH DAILY FOR 2 DAYS, THEN 20MG DAILY FOR 2 DAYS, THEN TABLET ORAL
Qty: 7 | Refills: 0 | Status: ON HOLD | COMMUNITY
Start: 2018-07-14

## 2021-08-13 RX ORDER — AMLODIPINE BESYLATE 5 MG
TAKE 1 TABLET DAILY TABLET ORAL
Refills: 0 | Status: ON HOLD | COMMUNITY

## 2021-08-13 RX ORDER — OMEPRAZOLE 20 MG/1
CAPSULE, DELAYED RELEASE ORAL
Qty: 30 | Refills: 0 | Status: ON HOLD | COMMUNITY
Start: 2011-01-13

## 2021-08-13 RX ORDER — PIOGLITAZONE HCL 15 MG
TAKE 1 TABLET BY MOUTH ONCE DAILY TABLET ORAL
Qty: 30 | Refills: 0 | Status: ON HOLD | COMMUNITY
Start: 2011-07-01

## 2021-08-13 RX ORDER — PRAVASTATIN SODIUM 20 MG/1
1 TABLET TABLET ORAL ONCE A DAY
Status: ACTIVE | COMMUNITY

## 2021-08-13 RX ORDER — METFORMIN HYDROCHLORIDE 500 MG/1
1 TABLET WITH A MEAL TABLET, FILM COATED ORAL TWICE DAILY
Status: ACTIVE | COMMUNITY

## 2021-08-13 RX ORDER — OSELTAMIVIR PHOSPHATE 75 MG/1
CAPSULE ORAL
Qty: 10 | Refills: 0 | Status: ON HOLD | COMMUNITY
Start: 2019-05-05

## 2021-08-13 RX ORDER — AMLODIPINE BESYLATE 10 MG/1
TABLET ORAL
Qty: 30 | Refills: 0 | Status: ON HOLD | COMMUNITY
Start: 2019-03-03

## 2021-08-13 RX ORDER — DICLOFENAC SODIUM 1 %
APPLY 2 GRAMS TO AFFECTED AREA THREE TIMES A DAY GEL (GRAM) TOPICAL
Qty: 255 | Refills: 0 | Status: ON HOLD | COMMUNITY
Start: 2012-05-29

## 2021-08-13 RX ORDER — CETIRIZINE HYDROCHLORIDE, PSEUDOEPHEDRINE HYDROCHLORIDE 5; 120 MG/1; MG/1
TAKE 2 TABLETS BY MOUTH TWICE A DAY TABLET, FILM COATED, EXTENDED RELEASE ORAL
Qty: 60 | Refills: 0 | Status: ON HOLD | COMMUNITY
Start: 2019-12-16

## 2021-08-13 RX ORDER — OXYCODONE AND ACETAMINOPHEN 5; 325 MG/1; MG/1
TAKE 1 TABLET BY MOUTH EVERY 4 HOURS IF NEEDED FOR PAIN TABLET ORAL
Qty: 12 | Refills: 0 | Status: ON HOLD | COMMUNITY
Start: 2018-09-01

## 2021-08-13 RX ORDER — HYOSCYAMINE SULFATE 0.12 MG/1
DISSOLVE 1 TABLET UNDER THE TONGUE EVERY 4 TO 6 HOURS AS NEEDED FOR ABDOMINAL PAIN TABLET, ORALLY DISINTEGRATING ORAL
Qty: 45 | Refills: 2 | Status: ON HOLD | COMMUNITY
Start: 2020-04-15

## 2021-08-13 RX ORDER — ZOLPIDEM TARTRATE 5 MG/1
TAKE 1 TABLET BY MOUTH IF NEEDED FOR SLEEP TABLET, FILM COATED ORAL
Qty: 30 | Refills: 0 | Status: ON HOLD | COMMUNITY
Start: 2012-03-22

## 2021-08-13 RX ORDER — LEVETIRACETAM 500 MG/1
TAKE 1 TABLET BY MOUTH TWICE A DAY TABLET ORAL
Qty: 60 | Refills: 0 | Status: ON HOLD | COMMUNITY
Start: 2019-11-25

## 2021-08-13 RX ORDER — METRONIDAZOLE 500 MG/1
TAKE 1 TABLET BY MOUTH TWICE A DAY TABLET ORAL
Qty: 20 | Refills: 0 | Status: ON HOLD | COMMUNITY
Start: 2018-12-13

## 2021-08-13 RX ORDER — METOPROLOL SUCCINATE 50 MG/1
TAKE 1 TABLET DAILY TABLET, EXTENDED RELEASE ORAL
Refills: 0 | Status: ACTIVE | COMMUNITY

## 2021-08-13 RX ORDER — HYDROCODONE BITARTRATE AND ACETAMINOPHEN 10; 325 MG/1; MG/1
TABLET ORAL
Qty: 21 | Refills: 0 | Status: ACTIVE | COMMUNITY
Start: 2019-04-12

## 2021-08-13 RX ORDER — PIOGLITAZONE 15 MG/1
TABLET ORAL
Qty: 90 | Refills: 0 | Status: ACTIVE | COMMUNITY
Start: 2019-03-04

## 2021-08-13 RX ORDER — WARFARIN SODIUM 10 MG/1
1 TABLET TABLET ORAL ONCE A DAY
Status: ACTIVE | COMMUNITY

## 2021-08-13 RX ORDER — FLUCONAZOLE 100 MG/1
TABLET ORAL
Refills: 0 | Status: ON HOLD | COMMUNITY
Start: 2012-07-19

## 2021-08-13 RX ORDER — CLOTRIMAZOLE AND BETAMETHASONE DIPROPIONATE 10; .5 MG/G; MG/G
APPLY TO AFFECTED AREA ONCE DAILY FOR 7 DAYS AS DIRECTED CREAM TOPICAL
Qty: 15 | Refills: 0 | Status: ON HOLD | COMMUNITY
Start: 2012-07-19

## 2021-08-13 RX ORDER — NEOMYCIN SULFATE, POLYMYXIN B SULFATE AND HYDROCORTISONE 10; 3.5; 1 MG/ML; MG/ML; [USP'U]/ML
SUSPENSION/ DROPS AURICULAR (OTIC)
Qty: 8 | Refills: 0 | Status: ON HOLD | COMMUNITY
Start: 2019-01-25

## 2021-08-13 RX ORDER — FLUCONAZOLE 150 MG/1
TAKE 1 TABLET BY MOUTH FOR INFECTION WAIT THREE DAYS MAY REPEAT TWO MO TABLET ORAL
Qty: 3 | Refills: 0 | Status: ON HOLD | COMMUNITY
Start: 2019-12-26

## 2021-08-13 RX ORDER — ENOXAPARIN SODIUM 100 MG/ML
INJECTION SUBCUTANEOUS
Qty: 5 | Refills: 0 | Status: ON HOLD | COMMUNITY
Start: 2012-11-01

## 2021-08-13 RX ORDER — ONDANSETRON HYDROCHLORIDE 4 MG/1
TABLET, FILM COATED ORAL
Qty: 60 | Refills: 0 | Status: ON HOLD | COMMUNITY
Start: 2018-01-15

## 2021-08-13 RX ORDER — GLIPIZIDE 5 MG/1
TABLET ORAL
Qty: 60 | Refills: 0 | Status: ON HOLD | COMMUNITY
Start: 2010-12-14

## 2021-08-13 RX ORDER — ZOLPIDEM TARTRATE 10 MG/1
TAKE 1 TABLET BY MOUTH AT BEDTIME IF NEEDED FOR SLEEP TABLET, FILM COATED ORAL
Qty: 30 | Refills: 0 | Status: ON HOLD | COMMUNITY
Start: 2011-07-01

## 2021-08-13 RX ORDER — METHYLPREDNISOLONE 4 MG/1
TABLET ORAL
Qty: 21 | Refills: 0 | Status: ON HOLD | COMMUNITY
Start: 2020-02-01

## 2021-08-13 RX ORDER — IRON/C/B12/CALCIU/STOMACH CONC 70-150-10
TABLET ORAL
Qty: 30 | Refills: 0 | Status: ON HOLD | COMMUNITY
Start: 2018-10-24

## 2021-08-13 RX ORDER — HYOSCYAMINE SULFATE 0.12 MG/1
PLACE 1 TABLET EVERY 4-6 HOURS PRN ABDOMINAL PAIN TABLET, ORALLY DISINTEGRATING ORAL
Qty: 60 | Refills: 2 | Status: ON HOLD | COMMUNITY
Start: 2020-04-15

## 2021-08-13 RX ORDER — METHOCARBAMOL 500 MG/1
TAKE 1 TABLET BY MOUTH EVERY 8 HOURS IF NEEDED TABLET, FILM COATED ORAL
Qty: 15 | Refills: 0 | Status: ON HOLD | COMMUNITY
Start: 2018-07-24

## 2021-08-13 RX ORDER — TRIAMTERENE AND HYDROCHLOROTHIAZIDE 37.5; 25 MG/1; MG/1
CAPSULE ORAL
Qty: 30 | Refills: 0 | Status: ON HOLD | COMMUNITY
Start: 2011-09-27

## 2021-08-13 RX ORDER — AZITHROMYCIN DIHYDRATE 250 MG/1
2 TABLET  ON THE FIRST DAY, THEN 1 TABLET DAILY FOR 4 DAYS TABLET, FILM COATED ORAL ONCE A DAY
Refills: 0 | Status: ACTIVE | COMMUNITY
Start: 2020-04-05

## 2021-08-13 RX ORDER — SUCRALFATE 1 G/10ML
10 ML ON AN EMPTY STOMACH PRN SUSPENSION ORAL
Status: ON HOLD | COMMUNITY

## 2021-08-13 RX ORDER — ASPIRIN 81 MG/1
1 TABLET TABLET ORAL ONCE A DAY
Status: ACTIVE | COMMUNITY

## 2021-08-13 RX ORDER — FERROUS SULFATE 325(65) MG
TAKE 1 TABLET BY MOUTH ONCE DAILY TABLET ORAL
Qty: 30 | Refills: 0 | Status: ON HOLD | COMMUNITY
Start: 2019-04-23

## 2021-08-13 RX ORDER — FERRIC CARBOXYMALTOSE INJECTION 50 MG/ML
AS DIRECTED INJECTION, SOLUTION INTRAVENOUS
Status: ON HOLD | COMMUNITY

## 2021-08-13 RX ORDER — BROMPHENIRAMINE MALEATE, PSEUDOEPHEDRINE HYDROCHLORIDE, 2; 30; 10 MG/5ML; MG/5ML; MG/5ML
TAKE 5 TO 10 MILLILITERS BY MOUTH THREE TIMES A DAY IF NEEDED SYRUP ORAL
Qty: 180 | Refills: 0 | Status: ON HOLD | COMMUNITY
Start: 2019-05-05

## 2021-08-13 RX ORDER — CIPROFLOXACIN HYDROCHLORIDE 500 MG/1
TAKE 1 TABLET BY MOUTH TWICE A DAY TABLET, FILM COATED ORAL
Qty: 20 | Refills: 0 | Status: ON HOLD | COMMUNITY
Start: 2018-12-13

## 2021-08-13 RX ORDER — TRIAMTERENE AND HYDROCHLOROTHIAZIDE 37.5; 25 MG/1; MG/1
TABLET ORAL
Qty: 30 | Refills: 0 | Status: ON HOLD | COMMUNITY
Start: 2011-03-22

## 2021-08-13 RX ORDER — HYDROCHLOROTHIAZIDE 25 MG/1
TAKE 1 TABLET DAILY TABLET ORAL
Refills: 0 | Status: ACTIVE | COMMUNITY
Start: 2020-06-12

## 2021-08-13 RX ORDER — SODIUM PICOSULFATE, MAGNESIUM OXIDE, AND ANHYDROUS CITRIC ACID 10; 3.5; 12 MG/160ML; G/160ML; G/160ML
160 ML LIQUID ORAL
Qty: 160 ML | Refills: 0 | OUTPATIENT
Start: 2021-08-23 | End: 2021-08-24

## 2021-08-13 NOTE — HPI-TODAY'S VISIT:
Ms. 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  She was last seen May 19th.  Previous EGD 5/27/20 revealed a medium-sized hiatal hernia, otherwise normal esophagus, nonbleeding angioctasia in the gastric body which was coagulated with the bipolar probe, otherwise normal stomach, medium sized angioctasia in the fourth portion of duodenum status post bipolar cautery, otherwise normal duodenum.  She had labs with her PCP last month and was told it was normal.  She had an ultrasound at [facility] last week and was told it was normal.  She continues to have upper abdominal pain. It is exacerbated when moving or laying down.  It is unaffected by eating and not relieved with defecation.  She feels very full after eating.   SHe denies abdominal pain, nausea, vomiting, change in bowel habits, blood in the stool or weight loss.  She takes hydrocodone 4-5 times a week.  Recent CT abdomen and pelvis with contrast 4/21/20 with no acute pathology, gallbladder was surgically absent, pancreas was normal, on diverticulosis without diverticulitis, moderate fat-containing right inguinal hernia, degenerative changes of the spine.    Labs  4/8/20: BUN 29, creatinine 1.55, AST 15, ALT 12, alkaline phosphatase 137, T bili 0.2, abdomen 4.3, amylase 126, lipase 81; PT 21, INR 2   Gastric emptying study 12/22/16: Normal gastric emptying for solids, 75% emptying of stomach and 60 minutes. KUB 11/12/16: minimal stool, otherwise normal. CTAP with contrast 10/27/16: small hiatal hernia, status post cholecystectomy, small cortical renal cyst and left colonic/sigmoid diverticulosis, mild lumbar spondylosis.   Colonoscopy 11/6/12: diverticulosis in sigmoid colon, transverse colon, and ascending colon. The patient comes in today for second opinion in regards to chronic abdominal pain.  Of note I know the patient from the past when I took care of her mother.  I have also taken care of her father in the past.  She states the abdominal pain is in her upper abdomen.  It comes and goes.  It is associated with fairly severe constipation which continues to be a problem.  She has lost weight due to poor appetite.  About 15 pounds.  Her primary care physician has become very concerned about this.  She otherwise feels well.

## 2021-08-13 NOTE — EXAM-PHYSICAL EXAM
She is alert and oriented to person place and situation no acute distress.  She has no scleral icterus.

## 2021-09-30 ENCOUNTER — CLAIMS CREATED FROM THE CLAIM WINDOW (OUTPATIENT)
Dept: URBAN - METROPOLITAN AREA CLINIC 4 | Facility: CLINIC | Age: 76
End: 2021-09-30
Payer: COMMERCIAL

## 2021-09-30 ENCOUNTER — TELEPHONE ENCOUNTER (OUTPATIENT)
Dept: URBAN - METROPOLITAN AREA CLINIC 113 | Facility: CLINIC | Age: 76
End: 2021-09-30

## 2021-09-30 ENCOUNTER — OFFICE VISIT (OUTPATIENT)
Dept: URBAN - METROPOLITAN AREA SURGERY CENTER 25 | Facility: SURGERY CENTER | Age: 76
End: 2021-09-30
Payer: COMMERCIAL

## 2021-09-30 DIAGNOSIS — D12.2 ADENOMA OF ASCENDING COLON: ICD-10-CM

## 2021-09-30 DIAGNOSIS — D12.2 BENIGN NEOPLASM OF ASCENDING COLON: ICD-10-CM

## 2021-09-30 DIAGNOSIS — K59.00 CONSTIPATION: ICD-10-CM

## 2021-09-30 PROCEDURE — 88305 TISSUE EXAM BY PATHOLOGIST: CPT | Performed by: PATHOLOGY

## 2021-09-30 PROCEDURE — 45385 COLONOSCOPY W/LESION REMOVAL: CPT | Performed by: INTERNAL MEDICINE

## 2021-09-30 PROCEDURE — G8907 PT DOC NO EVENTS ON DISCHARG: HCPCS | Performed by: INTERNAL MEDICINE

## 2021-09-30 RX ORDER — AZITHROMYCIN DIHYDRATE 250 MG/1
2 TABLET  ON THE FIRST DAY, THEN 1 TABLET DAILY FOR 4 DAYS TABLET, FILM COATED ORAL ONCE A DAY
Refills: 0 | Status: ACTIVE | COMMUNITY
Start: 2020-04-05

## 2021-09-30 RX ORDER — CLOTRIMAZOLE AND BETAMETHASONE DIPROPIONATE 10; .5 MG/G; MG/G
APPLY TO AFFECTED AREA ONCE DAILY FOR 7 DAYS AS DIRECTED CREAM TOPICAL
Qty: 15 | Refills: 0 | Status: ON HOLD | COMMUNITY
Start: 2012-07-19

## 2021-09-30 RX ORDER — CIPROFLOXACIN HYDROCHLORIDE 500 MG/1
TAKE 1 TABLET BY MOUTH TWICE A DAY TABLET, FILM COATED ORAL
Qty: 20 | Refills: 0 | Status: ON HOLD | COMMUNITY
Start: 2018-12-13

## 2021-09-30 RX ORDER — OSELTAMIVIR PHOSPHATE 75 MG/1
CAPSULE ORAL
Qty: 10 | Refills: 0 | Status: ON HOLD | COMMUNITY
Start: 2019-05-05

## 2021-09-30 RX ORDER — OXYCODONE AND ACETAMINOPHEN 5; 325 MG/1; MG/1
TAKE 1 TABLET BY MOUTH EVERY 4 HOURS IF NEEDED FOR PAIN TABLET ORAL
Qty: 12 | Refills: 0 | Status: ON HOLD | COMMUNITY
Start: 2018-09-01

## 2021-09-30 RX ORDER — METOPROLOL SUCCINATE 50 MG/1
TAKE 1 TABLET DAILY TABLET, EXTENDED RELEASE ORAL
Refills: 0 | Status: ACTIVE | COMMUNITY

## 2021-09-30 RX ORDER — TIZANIDINE 4 MG/1
TAKE 1-2 TABLET BY MOUTH AS NEEDED FOR CRAMPS/ PAIN MAX TWICE DAILY TABLET ORAL
Qty: 60 | Refills: 0 | Status: ACTIVE | COMMUNITY
Start: 2019-11-25

## 2021-09-30 RX ORDER — POTASSIUM CHLORIDE 750 MG/1
TAKE 1 CAPSULE DAILY CAPSULE, EXTENDED RELEASE ORAL
Refills: 0 | Status: ON HOLD | COMMUNITY

## 2021-09-30 RX ORDER — GLIPIZIDE 5 MG/1
TABLET ORAL
Qty: 60 | Refills: 0 | Status: ON HOLD | COMMUNITY
Start: 2010-12-14

## 2021-09-30 RX ORDER — WARFARIN SODIUM 10 MG/1
1 TABLET TABLET ORAL ONCE A DAY
Status: ACTIVE | COMMUNITY

## 2021-09-30 RX ORDER — WARFARIN 5 MG/1
TABLET ORAL
Qty: 60 | Refills: 0 | Status: ACTIVE | COMMUNITY
Start: 2010-09-03

## 2021-09-30 RX ORDER — DICLOFENAC SODIUM 1 %
APPLY 2 GRAMS TO AFFECTED AREA THREE TIMES A DAY GEL (GRAM) TOPICAL
Qty: 255 | Refills: 0 | Status: ON HOLD | COMMUNITY
Start: 2012-05-29

## 2021-09-30 RX ORDER — PIOGLITAZONE HCL 15 MG
TAKE 1 TABLET BY MOUTH ONCE DAILY TABLET ORAL
Qty: 30 | Refills: 0 | Status: ON HOLD | COMMUNITY
Start: 2011-07-01

## 2021-09-30 RX ORDER — HYDROCHLOROTHIAZIDE 25 MG/1
TAKE 1 TABLET DAILY TABLET ORAL
Refills: 0 | Status: ACTIVE | COMMUNITY
Start: 2020-06-12

## 2021-09-30 RX ORDER — TRIAMTERENE AND HYDROCHLOROTHIAZIDE 37.5; 25 MG/1; MG/1
CAPSULE ORAL
Qty: 30 | Refills: 0 | Status: ON HOLD | COMMUNITY
Start: 2011-09-27

## 2021-09-30 RX ORDER — PREDNISONE 20 MG/1
TAKE 40MG BY MOUTH DAILY FOR 2 DAYS, THEN 20MG DAILY FOR 2 DAYS, THEN TABLET ORAL
Qty: 7 | Refills: 0 | Status: ON HOLD | COMMUNITY
Start: 2018-07-14

## 2021-09-30 RX ORDER — METRONIDAZOLE 500 MG/1
TAKE 1 TABLET BY MOUTH TWICE A DAY TABLET ORAL
Qty: 20 | Refills: 0 | Status: ON HOLD | COMMUNITY
Start: 2018-12-13

## 2021-09-30 RX ORDER — POTASSIUM CHLORIDE 750 MG/1
TABLET, EXTENDED RELEASE ORAL
Qty: 60 | Refills: 0 | Status: ON HOLD | COMMUNITY
Start: 2011-07-06

## 2021-09-30 RX ORDER — HYDROCODONE BITARTRATE AND ACETAMINOPHEN 10; 325 MG/1; MG/1
TABLET ORAL
Qty: 21 | Refills: 0 | Status: ACTIVE | COMMUNITY
Start: 2019-04-12

## 2021-09-30 RX ORDER — AMLODIPINE BESYLATE 5 MG/1
1 TABLET TABLET ORAL ONCE A DAY
Refills: 0 | Status: ACTIVE | COMMUNITY
Start: 2011-01-10

## 2021-09-30 RX ORDER — TRIAMTERENE AND HYDROCHLOROTHIAZIDE 37.5; 25 MG/1; MG/1
TABLET ORAL
Qty: 30 | Refills: 0 | Status: ON HOLD | COMMUNITY
Start: 2011-03-22

## 2021-09-30 RX ORDER — METHYLPREDNISOLONE 4 MG/1
TABLET ORAL
Qty: 21 | Refills: 0 | Status: ON HOLD | COMMUNITY
Start: 2020-02-01

## 2021-09-30 RX ORDER — AMLODIPINE BESYLATE 10 MG/1
TABLET ORAL
Qty: 30 | Refills: 0 | Status: ON HOLD | COMMUNITY
Start: 2019-03-03

## 2021-09-30 RX ORDER — BROMPHENIRAMINE MALEATE, PSEUDOEPHEDRINE HYDROCHLORIDE, 2; 30; 10 MG/5ML; MG/5ML; MG/5ML
TAKE 5 TO 10 MILLILITERS BY MOUTH THREE TIMES A DAY IF NEEDED SYRUP ORAL
Qty: 180 | Refills: 0 | Status: ON HOLD | COMMUNITY
Start: 2019-05-05

## 2021-09-30 RX ORDER — METFORMIN HYDROCHLORIDE 500 MG/1
1 TABLET WITH A MEAL TABLET, FILM COATED ORAL TWICE DAILY
Status: ACTIVE | COMMUNITY

## 2021-09-30 RX ORDER — PRAVASTATIN SODIUM 20 MG/1
1 TABLET TABLET ORAL ONCE A DAY
Status: ACTIVE | COMMUNITY

## 2021-09-30 RX ORDER — LOSARTAN POTASSIUM 50 MG/1
1 TABLET TABLET, FILM COATED ORAL ONCE A DAY
Refills: 0 | Status: ACTIVE | COMMUNITY
Start: 2019-03-03

## 2021-09-30 RX ORDER — ENOXAPARIN SODIUM 100 MG/ML
INJECTION SUBCUTANEOUS
Qty: 5 | Refills: 0 | Status: ON HOLD | COMMUNITY
Start: 2012-11-01

## 2021-09-30 RX ORDER — LEVETIRACETAM 500 MG/1
TAKE 1 TABLET BY MOUTH TWICE A DAY TABLET ORAL
Qty: 60 | Refills: 0 | Status: ON HOLD | COMMUNITY
Start: 2019-11-25

## 2021-09-30 RX ORDER — PIOGLITAZONE 15 MG/1
TABLET ORAL
Qty: 90 | Refills: 0 | Status: ACTIVE | COMMUNITY
Start: 2019-03-04

## 2021-09-30 RX ORDER — PANTOPRAZOLE SODIUM 40 MG/1
1 TABLET TABLET, DELAYED RELEASE ORAL TWICE A DAY
Status: ACTIVE | COMMUNITY

## 2021-09-30 RX ORDER — FERRIC CARBOXYMALTOSE INJECTION 50 MG/ML
AS DIRECTED INJECTION, SOLUTION INTRAVENOUS
Status: ON HOLD | COMMUNITY

## 2021-09-30 RX ORDER — FLUCONAZOLE 150 MG/1
TAKE 1 TABLET BY MOUTH FOR INFECTION WAIT THREE DAYS MAY REPEAT TWO MO TABLET ORAL
Qty: 3 | Refills: 0 | Status: ON HOLD | COMMUNITY
Start: 2019-12-26

## 2021-09-30 RX ORDER — SUCRALFATE 1 G/10ML
10 ML ON AN EMPTY STOMACH PRN SUSPENSION ORAL
Status: ON HOLD | COMMUNITY

## 2021-09-30 RX ORDER — METHOCARBAMOL 500 MG/1
TAKE 1 TABLET BY MOUTH EVERY 8 HOURS IF NEEDED TABLET, FILM COATED ORAL
Qty: 15 | Refills: 0 | Status: ON HOLD | COMMUNITY
Start: 2018-07-24

## 2021-09-30 RX ORDER — ONDANSETRON HYDROCHLORIDE 4 MG/1
TABLET, FILM COATED ORAL
Qty: 60 | Refills: 0 | Status: ON HOLD | COMMUNITY
Start: 2018-01-15

## 2021-09-30 RX ORDER — AMOXICILLIN AND CLAVULANATE POTASSIUM 875; 125 MG/1; MG/1
TAKE 1 TABLET BY MOUTH TWICE A DAY FOR 10 DAYS TABLET, FILM COATED ORAL
Qty: 20 | Refills: 0 | Status: ON HOLD | COMMUNITY
Start: 2012-09-17

## 2021-09-30 RX ORDER — IRON/C/B12/CALCIU/STOMACH CONC 70-150-10
TABLET ORAL
Qty: 30 | Refills: 0 | Status: ON HOLD | COMMUNITY
Start: 2018-10-24

## 2021-09-30 RX ORDER — AZITHROMYCIN DIHYDRATE 500 MG/1
TAKE 4 TABLETS AS 1 DOSE TABLET, FILM COATED ORAL
Qty: 4 | Refills: 0 | Status: ON HOLD | COMMUNITY
Start: 2011-10-05

## 2021-09-30 RX ORDER — FLUCONAZOLE 100 MG/1
TABLET ORAL
Refills: 0 | Status: ON HOLD | COMMUNITY
Start: 2012-07-19

## 2021-09-30 RX ORDER — CETIRIZINE HYDROCHLORIDE, PSEUDOEPHEDRINE HYDROCHLORIDE 5; 120 MG/1; MG/1
TAKE 2 TABLETS BY MOUTH TWICE A DAY TABLET, FILM COATED, EXTENDED RELEASE ORAL
Qty: 60 | Refills: 0 | Status: ON HOLD | COMMUNITY
Start: 2019-12-16

## 2021-09-30 RX ORDER — FERROUS SULFATE 325(65) MG
TAKE 1 TABLET BY MOUTH ONCE DAILY TABLET ORAL
Qty: 30 | Refills: 0 | Status: ON HOLD | COMMUNITY
Start: 2019-04-23

## 2021-09-30 RX ORDER — HYOSCYAMINE SULFATE 0.12 MG/1
PLACE 1 TABLET EVERY 4-6 HOURS PRN ABDOMINAL PAIN TABLET, ORALLY DISINTEGRATING ORAL
Qty: 60 | Refills: 2 | Status: ON HOLD | COMMUNITY
Start: 2020-04-15

## 2021-09-30 RX ORDER — CHLORHEXIDINE GLUCONATE 4 %
TAKE 1 TABLET BY MOUTH DAILY LIQUID (ML) TOPICAL
Qty: 30 | Refills: 0 | Status: ON HOLD | COMMUNITY
Start: 2019-04-23

## 2021-09-30 RX ORDER — HYOSCYAMINE SULFATE 0.12 MG/1
DISSOLVE 1 TABLET UNDER THE TONGUE EVERY 4 TO 6 HOURS AS NEEDED FOR ABDOMINAL PAIN TABLET, ORALLY DISINTEGRATING ORAL
Qty: 45 | Refills: 2 | Status: ON HOLD | COMMUNITY
Start: 2020-04-15

## 2021-09-30 RX ORDER — TRAMADOL HYDROCHLORIDE AND ACETAMINOPHEN 37.5; 325 MG/1; MG/1
TABLET, FILM COATED ORAL
Qty: 90 | Refills: 0 | Status: ON HOLD | COMMUNITY
Start: 2011-01-13

## 2021-09-30 RX ORDER — AMLODIPINE BESYLATE 5 MG
TAKE 1 TABLET DAILY TABLET ORAL
Refills: 0 | Status: ON HOLD | COMMUNITY

## 2021-09-30 RX ORDER — ASPIRIN 81 MG/1
1 TABLET TABLET ORAL ONCE A DAY
Status: ACTIVE | COMMUNITY

## 2021-09-30 RX ORDER — ZOLPIDEM TARTRATE 10 MG/1
TAKE 1 TABLET BY MOUTH AT BEDTIME IF NEEDED FOR SLEEP TABLET, FILM COATED ORAL
Qty: 30 | Refills: 0 | Status: ON HOLD | COMMUNITY
Start: 2011-07-01

## 2021-09-30 RX ORDER — OMEPRAZOLE 20 MG/1
CAPSULE, DELAYED RELEASE ORAL
Qty: 30 | Refills: 0 | Status: ON HOLD | COMMUNITY
Start: 2011-01-13

## 2021-09-30 RX ORDER — NEOMYCIN SULFATE, POLYMYXIN B SULFATE AND HYDROCORTISONE 10; 3.5; 1 MG/ML; MG/ML; [USP'U]/ML
SUSPENSION/ DROPS AURICULAR (OTIC)
Qty: 8 | Refills: 0 | Status: ON HOLD | COMMUNITY
Start: 2019-01-25

## 2021-09-30 RX ORDER — ALPRAZOLAM 0.5 MG/1
1 TABLET TABLET ORAL ONCE A DAY
Refills: 0 | Status: ACTIVE | COMMUNITY
Start: 2019-03-04

## 2021-09-30 RX ORDER — ZOLPIDEM TARTRATE 5 MG/1
TAKE 1 TABLET BY MOUTH IF NEEDED FOR SLEEP TABLET, FILM COATED ORAL
Qty: 30 | Refills: 0 | Status: ON HOLD | COMMUNITY
Start: 2012-03-22

## 2021-10-01 ENCOUNTER — OFFICE VISIT (OUTPATIENT)
Dept: URBAN - METROPOLITAN AREA CLINIC 113 | Facility: CLINIC | Age: 76
End: 2021-10-01

## 2021-10-12 ENCOUNTER — OFFICE VISIT (OUTPATIENT)
Dept: URBAN - METROPOLITAN AREA CLINIC 113 | Facility: CLINIC | Age: 76
End: 2021-10-12
Payer: COMMERCIAL

## 2021-10-12 VITALS
HEART RATE: 67 BPM | DIASTOLIC BLOOD PRESSURE: 65 MMHG | SYSTOLIC BLOOD PRESSURE: 117 MMHG | BODY MASS INDEX: 27.31 KG/M2 | HEIGHT: 67 IN | TEMPERATURE: 97.6 F | WEIGHT: 174 LBS

## 2021-10-12 DIAGNOSIS — D12.2 ADENOMATOUS POLYP OF ASCENDING COLON: ICD-10-CM

## 2021-10-12 DIAGNOSIS — K57.90 DIVERTICULOSIS: ICD-10-CM

## 2021-10-12 DIAGNOSIS — K59.01 CONSTIPATION BY DELAYED COLONIC TRANSIT: ICD-10-CM

## 2021-10-12 DIAGNOSIS — R63.4 WEIGHT LOSS: ICD-10-CM

## 2021-10-12 DIAGNOSIS — K55.20 ANGIODYSPLASIA: ICD-10-CM

## 2021-10-12 DIAGNOSIS — R10.10 UPPER ABDOMINAL PAIN: ICD-10-CM

## 2021-10-12 PROBLEM — 83132003: Status: ACTIVE | Noted: 2020-12-02

## 2021-10-12 PROCEDURE — 99213 OFFICE O/P EST LOW 20 MIN: CPT | Performed by: INTERNAL MEDICINE

## 2021-10-12 RX ORDER — ALPRAZOLAM 0.5 MG/1
1 TABLET TABLET ORAL ONCE A DAY
Refills: 0 | Status: ACTIVE | COMMUNITY
Start: 2019-03-04

## 2021-10-12 RX ORDER — METFORMIN HYDROCHLORIDE 500 MG/1
1 TABLET WITH A MEAL TABLET, FILM COATED ORAL TWICE DAILY
Status: ACTIVE | COMMUNITY

## 2021-10-12 RX ORDER — AZITHROMYCIN DIHYDRATE 250 MG/1
2 TABLET  ON THE FIRST DAY, THEN 1 TABLET DAILY FOR 4 DAYS TABLET, FILM COATED ORAL ONCE A DAY
Refills: 0 | Status: ACTIVE | COMMUNITY
Start: 2020-04-05

## 2021-10-12 RX ORDER — METOPROLOL SUCCINATE 50 MG/1
TAKE 1 TABLET DAILY TABLET, EXTENDED RELEASE ORAL
Refills: 0 | Status: ACTIVE | COMMUNITY

## 2021-10-12 RX ORDER — WARFARIN SODIUM 10 MG/1
1 TABLET TABLET ORAL ONCE A DAY
Status: ACTIVE | COMMUNITY

## 2021-10-12 RX ORDER — AMLODIPINE BESYLATE 5 MG/1
1 TABLET TABLET ORAL ONCE A DAY
Refills: 0 | Status: ACTIVE | COMMUNITY
Start: 2011-01-10

## 2021-10-12 RX ORDER — ASPIRIN 81 MG/1
1 TABLET TABLET ORAL ONCE A DAY
Status: ACTIVE | COMMUNITY

## 2021-10-12 RX ORDER — TIZANIDINE 4 MG/1
TAKE 1-2 TABLET BY MOUTH AS NEEDED FOR CRAMPS/ PAIN MAX TWICE DAILY TABLET ORAL
Qty: 60 | Refills: 0 | Status: ACTIVE | COMMUNITY
Start: 2019-11-25

## 2021-10-12 RX ORDER — PIOGLITAZONE 15 MG/1
TABLET ORAL
Qty: 90 | Refills: 0 | Status: ACTIVE | COMMUNITY
Start: 2019-03-04

## 2021-10-12 RX ORDER — HYDROCODONE BITARTRATE AND ACETAMINOPHEN 10; 325 MG/1; MG/1
TABLET ORAL
Qty: 21 | Refills: 0 | Status: ACTIVE | COMMUNITY
Start: 2019-04-12

## 2021-10-12 RX ORDER — LOSARTAN POTASSIUM 50 MG/1
1 TABLET TABLET, FILM COATED ORAL ONCE A DAY
Refills: 0 | Status: ACTIVE | COMMUNITY
Start: 2019-03-03

## 2021-10-12 RX ORDER — PRAVASTATIN SODIUM 20 MG/1
1 TABLET TABLET ORAL ONCE A DAY
Status: ACTIVE | COMMUNITY

## 2021-10-12 RX ORDER — PANTOPRAZOLE SODIUM 40 MG/1
1 TABLET TABLET, DELAYED RELEASE ORAL TWICE A DAY
Status: ACTIVE | COMMUNITY

## 2021-10-12 RX ORDER — WARFARIN 5 MG/1
TABLET ORAL
Qty: 60 | Refills: 0 | Status: ACTIVE | COMMUNITY
Start: 2010-09-03

## 2021-10-12 RX ORDER — HYDROCHLOROTHIAZIDE 25 MG/1
TAKE 1 TABLET DAILY TABLET ORAL
Refills: 0 | Status: ACTIVE | COMMUNITY
Start: 2020-06-12

## 2022-07-19 ENCOUNTER — OFFICE VISIT (OUTPATIENT)
Dept: URBAN - METROPOLITAN AREA CLINIC 113 | Facility: CLINIC | Age: 77
End: 2022-07-19
Payer: COMMERCIAL

## 2022-07-19 ENCOUNTER — ERX REFILL RESPONSE (OUTPATIENT)
Dept: URBAN - METROPOLITAN AREA CLINIC 113 | Facility: CLINIC | Age: 77
End: 2022-07-19

## 2022-07-19 VITALS
BODY MASS INDEX: 27.62 KG/M2 | TEMPERATURE: 98 F | HEART RATE: 63 BPM | DIASTOLIC BLOOD PRESSURE: 65 MMHG | HEIGHT: 67 IN | SYSTOLIC BLOOD PRESSURE: 120 MMHG | WEIGHT: 176 LBS

## 2022-07-19 DIAGNOSIS — R10.9 RIGHT SIDED ABDOMINAL PAIN: ICD-10-CM

## 2022-07-19 DIAGNOSIS — K55.20 ANGIODYSPLASIA: ICD-10-CM

## 2022-07-19 DIAGNOSIS — K59.01 CONSTIPATION BY DELAYED COLONIC TRANSIT: ICD-10-CM

## 2022-07-19 DIAGNOSIS — D12.2 ADENOMATOUS POLYP OF ASCENDING COLON: ICD-10-CM

## 2022-07-19 PROCEDURE — 99214 OFFICE O/P EST MOD 30 MIN: CPT

## 2022-07-19 RX ORDER — HYDROCODONE BITARTRATE AND ACETAMINOPHEN 10; 325 MG/1; MG/1
TABLET ORAL
Qty: 21 | Refills: 0 | Status: ON HOLD | COMMUNITY
Start: 2019-04-12

## 2022-07-19 RX ORDER — SIMETHICONE 80 MG/1
1 TABLET AFTER MEALS AND AT BEDTIME AS NEEDED TABLET, CHEWABLE ORAL
Status: ACTIVE | COMMUNITY

## 2022-07-19 RX ORDER — DICYCLOMINE HYDROCHLORIDE 20 MG/1
1 CAPSULE TABLET ORAL
Qty: 90 | Refills: 1 | OUTPATIENT
Start: 2022-07-19 | End: 2022-09-17

## 2022-07-19 RX ORDER — WARFARIN 5 MG/1
TABLET ORAL
Qty: 60 | Refills: 0 | Status: ON HOLD | COMMUNITY
Start: 2010-09-03

## 2022-07-19 RX ORDER — AZITHROMYCIN DIHYDRATE 250 MG/1
2 TABLET  ON THE FIRST DAY, THEN 1 TABLET DAILY FOR 4 DAYS TABLET, FILM COATED ORAL ONCE A DAY
Refills: 0 | Status: ON HOLD | COMMUNITY
Start: 2020-04-05

## 2022-07-19 RX ORDER — ESOMEPRAZOLE MAGNESIUM 40 MG/1
1 CAPSULE CAPSULE, DELAYED RELEASE ORAL ONCE A DAY
Status: ACTIVE | COMMUNITY

## 2022-07-19 RX ORDER — WARFARIN SODIUM 10 MG/1
1 TABLET TABLET ORAL ONCE A DAY
Status: ON HOLD | COMMUNITY

## 2022-07-19 RX ORDER — METFORMIN HYDROCHLORIDE 500 MG/1
1 TABLET WITH A MEAL TABLET, FILM COATED ORAL TWICE DAILY
Status: ON HOLD | COMMUNITY

## 2022-07-19 RX ORDER — PRAVASTATIN SODIUM 20 MG/1
1 TABLET TABLET ORAL ONCE A DAY
Status: ON HOLD | COMMUNITY

## 2022-07-19 RX ORDER — LOSARTAN POTASSIUM 50 MG/1
1 TABLET TABLET, FILM COATED ORAL ONCE A DAY
Refills: 0 | Status: ON HOLD | COMMUNITY
Start: 2019-03-03

## 2022-07-19 RX ORDER — DICYCLOMINE HYDROCHLORIDE 20 MG/1
1 CAPSULE TABLET ORAL
Qty: 90 | Refills: 1 | OUTPATIENT

## 2022-07-19 RX ORDER — DICYCLOMINE HYDROCHLORIDE 20 MG/1
TAKE 1 TABLET BY MOUTH THREE TIMES DAILY AS NEEDED TABLET ORAL
Qty: 270 TABLET | Refills: 0 | OUTPATIENT

## 2022-07-19 RX ORDER — PANTOPRAZOLE SODIUM 40 MG/1
1 TABLET TABLET, DELAYED RELEASE ORAL TWICE A DAY
Status: ON HOLD | COMMUNITY

## 2022-07-19 RX ORDER — CETIRIZINE HYDROCHLORIDE 10 MG/1
1 TABLET TABLET, FILM COATED ORAL ONCE A DAY
Status: ACTIVE | COMMUNITY

## 2022-07-19 RX ORDER — TIZANIDINE 4 MG/1
TAKE 1-2 TABLET BY MOUTH AS NEEDED FOR CRAMPS/ PAIN MAX TWICE DAILY TABLET ORAL
Qty: 60 | Refills: 0 | Status: ON HOLD | COMMUNITY
Start: 2019-11-25

## 2022-07-19 RX ORDER — PIOGLITAZONE 15 MG/1
TABLET ORAL
Qty: 90 | Refills: 0 | Status: ON HOLD | COMMUNITY
Start: 2019-03-04

## 2022-07-19 RX ORDER — ASPIRIN 81 MG/1
1 TABLET TABLET ORAL ONCE A DAY
Status: ON HOLD | COMMUNITY

## 2022-07-19 RX ORDER — METOPROLOL SUCCINATE 50 MG/1
TAKE 1 TABLET DAILY TABLET, EXTENDED RELEASE ORAL
Refills: 0 | Status: ON HOLD | COMMUNITY

## 2022-07-19 RX ORDER — HYDROCHLOROTHIAZIDE 25 MG/1
TAKE 1 TABLET DAILY TABLET ORAL
Refills: 0 | Status: ON HOLD | COMMUNITY
Start: 2020-06-12

## 2022-07-19 RX ORDER — ORAL SEMAGLUTIDE 7 MG/1
1 TABLET AT LEAST 30 MINUTES BEFORE FIRST FOOD, BEVERAGE OR OTHER ORAL MEDICINE OF THE DAY TABLET ORAL ONCE A DAY
Status: ACTIVE | COMMUNITY

## 2022-07-19 RX ORDER — ALPRAZOLAM 0.5 MG/1
1 TABLET TABLET ORAL ONCE A DAY
Refills: 0 | Status: ON HOLD | COMMUNITY
Start: 2019-03-04

## 2022-07-19 RX ORDER — AMLODIPINE BESYLATE 5 MG/1
1 TABLET TABLET ORAL ONCE A DAY
Refills: 0 | Status: ON HOLD | COMMUNITY
Start: 2011-01-10

## 2022-07-19 RX ORDER — ACETAMINOPHEN 325 MG/1
1 TABLET AS NEEDED TABLET ORAL
Status: ACTIVE | COMMUNITY

## 2022-07-19 RX ORDER — ALBUTEROL SULFATE 90 UG/1
1 PUFF AS NEEDED AEROSOL, METERED RESPIRATORY (INHALATION)
Status: ACTIVE | COMMUNITY
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
## 2022-07-19 NOTE — HPI-OTHER HISTORIES
CT abdomen/pelvis with contrast 4/7/2022:Mild intrahepatic and hepatic biliary dilatation, common duct tapers distally.  Pancreas is normal: Diverticulosis.  CT scan of the abdomen/pelvis with contrast 1/26/2022:Liver pancreas and spleen are normal.  Diverticulosis without evidence of diverticulitis.  Normal appendix.  Small lateral hernia.  Abdominal ultrasound 12/28/2021:Previous cholecystectomy otherwise unremarkable.   Labs  4/8/20: BUN 29, creatinine 1.55, AST 15, ALT 12, alkaline phosphatase 137, T bili 0.2, abdomen 4.3, amylase 126, lipase 81; PT 21, INR 2   Gastric emptying study 12/22/16: Normal gastric emptying for solids, 75% emptying of stomach and 60 minutes. KUB 11/12/16: minimal stool, otherwise normal. CTAP with contrast 10/27/16: small hiatal hernia, status post cholecystectomy, small cortical renal cyst and left colonic/sigmoid diverticulosis, mild lumbar spondylosis.   Colonoscopy 11/6/12: diverticulosis in sigmoid colon, transverse colon, and ascending colon.

## 2022-07-19 NOTE — PHYSICAL EXAM GASTROINTESTINAL
soft, mild tenderness to palpation of RUQ, epigastric area, left side and right flank, nondistended , normal bowel sounds

## 2022-07-27 ENCOUNTER — OFFICE VISIT (OUTPATIENT)
Dept: URBAN - METROPOLITAN AREA CLINIC 113 | Facility: CLINIC | Age: 77
End: 2022-07-27

## 2022-08-24 ENCOUNTER — CLAIMS CREATED FROM THE CLAIM WINDOW (OUTPATIENT)
Dept: URBAN - METROPOLITAN AREA MEDICAL CENTER 19 | Facility: MEDICAL CENTER | Age: 77
End: 2022-08-24
Payer: COMMERCIAL

## 2022-08-24 DIAGNOSIS — K92.1 MELENA: ICD-10-CM

## 2022-08-24 DIAGNOSIS — Z95.2 AORTIC VALVE REPLACED: ICD-10-CM

## 2022-08-24 DIAGNOSIS — N17.9 AKI (ACUTE KIDNEY INJURY): ICD-10-CM

## 2022-08-24 DIAGNOSIS — R10.13 ABDOMINAL DISCOMFORT, EPIGASTRIC: ICD-10-CM

## 2022-08-24 DIAGNOSIS — D62 ACUTE POSTHEMORRHAGIC ANEMIA: ICD-10-CM

## 2022-08-24 DIAGNOSIS — Z79.82 ASPIRIN LONG-TERM USE: ICD-10-CM

## 2022-08-24 PROCEDURE — 99223 1ST HOSP IP/OBS HIGH 75: CPT | Performed by: INTERNAL MEDICINE

## 2022-08-25 ENCOUNTER — CLAIMS CREATED FROM THE CLAIM WINDOW (OUTPATIENT)
Dept: URBAN - METROPOLITAN AREA MEDICAL CENTER 19 | Facility: MEDICAL CENTER | Age: 77
End: 2022-08-25
Payer: COMMERCIAL

## 2022-08-25 DIAGNOSIS — D62 ACUTE POSTHEMORRHAGIC ANEMIA: ICD-10-CM

## 2022-08-25 DIAGNOSIS — K92.1 MELENA: ICD-10-CM

## 2022-08-25 PROCEDURE — 44360 SMALL BOWEL ENDOSCOPY: CPT | Performed by: INTERNAL MEDICINE

## 2022-08-25 PROCEDURE — 43235 EGD DIAGNOSTIC BRUSH WASH: CPT | Performed by: INTERNAL MEDICINE

## 2022-08-26 ENCOUNTER — CLAIMS CREATED FROM THE CLAIM WINDOW (OUTPATIENT)
Dept: URBAN - METROPOLITAN AREA MEDICAL CENTER 19 | Facility: MEDICAL CENTER | Age: 77
End: 2022-08-26
Payer: COMMERCIAL

## 2022-08-26 DIAGNOSIS — K92.1 MELENA: ICD-10-CM

## 2022-08-26 DIAGNOSIS — K57.30 ACQUIRED DIVERTICULOSIS OF COLON: ICD-10-CM

## 2022-08-26 DIAGNOSIS — D62 ACUTE POSTHEMORRHAGIC ANEMIA: ICD-10-CM

## 2022-08-26 PROCEDURE — 45378 DIAGNOSTIC COLONOSCOPY: CPT | Performed by: INTERNAL MEDICINE

## 2022-08-30 ENCOUNTER — OFFICE VISIT (OUTPATIENT)
Dept: URBAN - METROPOLITAN AREA CLINIC 113 | Facility: CLINIC | Age: 77
End: 2022-08-30
Payer: COMMERCIAL

## 2022-08-30 ENCOUNTER — LAB OUTSIDE AN ENCOUNTER (OUTPATIENT)
Dept: URBAN - METROPOLITAN AREA CLINIC 113 | Facility: CLINIC | Age: 77
End: 2022-08-30

## 2022-08-30 VITALS
BODY MASS INDEX: 27.31 KG/M2 | TEMPERATURE: 98 F | HEIGHT: 67 IN | SYSTOLIC BLOOD PRESSURE: 147 MMHG | DIASTOLIC BLOOD PRESSURE: 72 MMHG | HEART RATE: 58 BPM | WEIGHT: 174 LBS

## 2022-08-30 DIAGNOSIS — K59.01 CONSTIPATION BY DELAYED COLONIC TRANSIT: ICD-10-CM

## 2022-08-30 DIAGNOSIS — D12.2 ADENOMATOUS POLYP OF ASCENDING COLON: ICD-10-CM

## 2022-08-30 DIAGNOSIS — R10.9 UNSPECIFIED ABDOMINAL PAIN: ICD-10-CM

## 2022-08-30 DIAGNOSIS — G89.29 OTHER CHRONIC PAIN: ICD-10-CM

## 2022-08-30 DIAGNOSIS — K55.20 ANGIODYSPLASIA: ICD-10-CM

## 2022-08-30 DIAGNOSIS — D50.0 IRON DEFICIENCY ANEMIA DUE TO CHRONIC BLOOD LOSS: ICD-10-CM

## 2022-08-30 DIAGNOSIS — K92.1 MELENA: ICD-10-CM

## 2022-08-30 DIAGNOSIS — K57.90 DIVERTICULOSIS: ICD-10-CM

## 2022-08-30 PROBLEM — 82423001: Status: ACTIVE | Noted: 2022-08-30

## 2022-08-30 PROBLEM — 397881000: Status: ACTIVE | Noted: 2021-10-12

## 2022-08-30 PROBLEM — 235853006: Status: ACTIVE | Noted: 2020-12-02

## 2022-08-30 PROBLEM — 35298007: Status: ACTIVE | Noted: 2021-08-13

## 2022-08-30 PROCEDURE — 99214 OFFICE O/P EST MOD 30 MIN: CPT | Performed by: INTERNAL MEDICINE

## 2022-08-30 RX ORDER — METFORMIN HYDROCHLORIDE 500 MG/1
1 TABLET WITH A MEAL TABLET, FILM COATED ORAL TWICE DAILY
Status: ON HOLD | COMMUNITY

## 2022-08-30 RX ORDER — ALPRAZOLAM 0.5 MG/1
1 TABLET TABLET ORAL ONCE A DAY
Refills: 0 | Status: ON HOLD | COMMUNITY
Start: 2019-03-04

## 2022-08-30 RX ORDER — LOSARTAN POTASSIUM 50 MG/1
1 TABLET TABLET, FILM COATED ORAL ONCE A DAY
Refills: 0 | Status: ON HOLD | COMMUNITY
Start: 2019-03-03

## 2022-08-30 RX ORDER — ORAL SEMAGLUTIDE 7 MG/1
1 TABLET AT LEAST 30 MINUTES BEFORE FIRST FOOD, BEVERAGE OR OTHER ORAL MEDICINE OF THE DAY TABLET ORAL ONCE A DAY
Status: ACTIVE | COMMUNITY

## 2022-08-30 RX ORDER — METOPROLOL SUCCINATE 50 MG/1
TAKE 1 TABLET DAILY TABLET, EXTENDED RELEASE ORAL
Refills: 0 | Status: ON HOLD | COMMUNITY

## 2022-08-30 RX ORDER — CETIRIZINE HYDROCHLORIDE 10 MG/1
1 TABLET TABLET, FILM COATED ORAL ONCE A DAY
Status: ACTIVE | COMMUNITY

## 2022-08-30 RX ORDER — PRAVASTATIN SODIUM 20 MG/1
1 TABLET TABLET ORAL ONCE A DAY
Status: ON HOLD | COMMUNITY

## 2022-08-30 RX ORDER — ASPIRIN 81 MG/1
1 TABLET TABLET ORAL ONCE A DAY
Status: ON HOLD | COMMUNITY

## 2022-08-30 RX ORDER — HYDROCHLOROTHIAZIDE 25 MG/1
TAKE 1 TABLET DAILY TABLET ORAL
Refills: 0 | Status: ON HOLD | COMMUNITY
Start: 2020-06-12

## 2022-08-30 RX ORDER — DICYCLOMINE HYDROCHLORIDE 20 MG/1
TAKE 1 TABLET BY MOUTH THREE TIMES DAILY AS NEEDED TABLET ORAL
Qty: 270 TABLET | Refills: 0 | Status: ACTIVE | COMMUNITY

## 2022-08-30 RX ORDER — PIOGLITAZONE 15 MG/1
TABLET ORAL
Qty: 90 | Refills: 0 | Status: ON HOLD | COMMUNITY
Start: 2019-03-04

## 2022-08-30 RX ORDER — ACETAMINOPHEN 325 MG/1
1 TABLET AS NEEDED TABLET ORAL
Status: ACTIVE | COMMUNITY

## 2022-08-30 RX ORDER — HYDROCODONE BITARTRATE AND ACETAMINOPHEN 10; 325 MG/1; MG/1
TABLET ORAL
Qty: 21 | Refills: 0 | Status: ON HOLD | COMMUNITY
Start: 2019-04-12

## 2022-08-30 RX ORDER — WARFARIN 5 MG/1
TABLET ORAL
Qty: 60 | Refills: 0 | Status: ON HOLD | COMMUNITY
Start: 2010-09-03

## 2022-08-30 RX ORDER — WARFARIN SODIUM 10 MG/1
1 TABLET TABLET ORAL ONCE A DAY
Status: ON HOLD | COMMUNITY

## 2022-08-30 RX ORDER — AZITHROMYCIN DIHYDRATE 250 MG/1
2 TABLET  ON THE FIRST DAY, THEN 1 TABLET DAILY FOR 4 DAYS TABLET, FILM COATED ORAL ONCE A DAY
Refills: 0 | Status: ON HOLD | COMMUNITY
Start: 2020-04-05

## 2022-08-30 RX ORDER — ALBUTEROL SULFATE 90 UG/1
1 PUFF AS NEEDED AEROSOL, METERED RESPIRATORY (INHALATION)
Status: ACTIVE | COMMUNITY

## 2022-08-30 RX ORDER — TIZANIDINE 4 MG/1
TAKE 1-2 TABLET BY MOUTH AS NEEDED FOR CRAMPS/ PAIN MAX TWICE DAILY TABLET ORAL
Qty: 60 | Refills: 0 | Status: ON HOLD | COMMUNITY
Start: 2019-11-25

## 2022-08-30 RX ORDER — PANTOPRAZOLE SODIUM 40 MG/1
1 TABLET TABLET, DELAYED RELEASE ORAL TWICE A DAY
Status: ON HOLD | COMMUNITY

## 2022-08-30 RX ORDER — AMLODIPINE BESYLATE 5 MG/1
1 TABLET TABLET ORAL ONCE A DAY
Refills: 0 | Status: ON HOLD | COMMUNITY
Start: 2011-01-10

## 2022-08-30 RX ORDER — ESOMEPRAZOLE MAGNESIUM 40 MG/1
1 CAPSULE CAPSULE, DELAYED RELEASE ORAL ONCE A DAY
Status: ACTIVE | COMMUNITY

## 2022-08-30 RX ORDER — SIMETHICONE 80 MG/1
1 TABLET AFTER MEALS AND AT BEDTIME AS NEEDED TABLET, CHEWABLE ORAL
Status: ACTIVE | COMMUNITY

## 2022-08-30 NOTE — EXAM-PHYSICAL EXAM
She is alert and oriented to person place and situation no acute distress.  There is no scleral icterus.  She is mildly pale in appearance.
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
## 2022-09-01 ENCOUNTER — OFFICE VISIT (OUTPATIENT)
Dept: URBAN - METROPOLITAN AREA CLINIC 112 | Facility: CLINIC | Age: 77
End: 2022-09-01

## 2022-09-01 ENCOUNTER — LAB OUTSIDE AN ENCOUNTER (OUTPATIENT)
Dept: URBAN - METROPOLITAN AREA CLINIC 113 | Facility: CLINIC | Age: 77
End: 2022-09-01

## 2022-09-01 ENCOUNTER — OFFICE VISIT (OUTPATIENT)
Dept: URBAN - METROPOLITAN AREA CLINIC 112 | Facility: CLINIC | Age: 77
End: 2022-09-01
Payer: COMMERCIAL

## 2022-09-01 ENCOUNTER — TELEPHONE ENCOUNTER (OUTPATIENT)
Dept: URBAN - METROPOLITAN AREA CLINIC 113 | Facility: CLINIC | Age: 77
End: 2022-09-01

## 2022-09-01 VITALS
TEMPERATURE: 97.7 F | HEIGHT: 67 IN | SYSTOLIC BLOOD PRESSURE: 153 MMHG | WEIGHT: 175 LBS | DIASTOLIC BLOOD PRESSURE: 74 MMHG | BODY MASS INDEX: 27.47 KG/M2 | HEART RATE: 59 BPM

## 2022-09-01 DIAGNOSIS — K92.1 ACUTE MELENA: ICD-10-CM

## 2022-09-01 DIAGNOSIS — D50.0 ANEMIA: ICD-10-CM

## 2022-09-01 PROBLEM — 285701000119108 HISTORY OF HEART VALVE REPAIR WITH PROSTHESIS: Status: ACTIVE | Noted: 2022-09-01

## 2022-09-01 PROBLEM — 724556004: Status: ACTIVE | Noted: 2022-08-30

## 2022-09-01 LAB
ABSOLUTE BASOPHILS: 37
ABSOLUTE EOSINOPHILS: 93
ABSOLUTE LYMPHOCYTES: 1240
ABSOLUTE MONOCYTES: 415
ABSOLUTE NEUTROPHILS: 4414
BASOPHILS: 0.6
EOSINOPHILS: 1.5
FERRITIN, SERUM: 130
HEMATOCRIT: 30.9
HEMOGLOBIN: 9.5
IRON BIND.CAP.(TIBC): 291
IRON SATURATION: 11
IRON: 32
LYMPHOCYTES: 20
MCH: 25.8
MCHC: 30.7
MCV: 84
MONOCYTES: 6.7
MPV: 11.1
NEUTROPHILS: 71.2
PLATELET COUNT: 296
RDW: 15.5
RED BLOOD CELL COUNT: 3.68
WHITE BLOOD CELL COUNT: 6.2

## 2022-09-01 PROCEDURE — 91110 GI TRC IMG INTRAL ESOPH-ILE: CPT | Performed by: INTERNAL MEDICINE

## 2022-09-01 RX ORDER — SIMETHICONE 80 MG/1
1 TABLET AFTER MEALS AND AT BEDTIME AS NEEDED TABLET, CHEWABLE ORAL
Status: ACTIVE | COMMUNITY

## 2022-09-01 RX ORDER — TIZANIDINE 4 MG/1
TAKE 1-2 TABLET BY MOUTH AS NEEDED FOR CRAMPS/ PAIN MAX TWICE DAILY TABLET ORAL
Qty: 60 | Refills: 0 | Status: ON HOLD | COMMUNITY
Start: 2019-11-25

## 2022-09-01 RX ORDER — LOSARTAN POTASSIUM 50 MG/1
1 TABLET TABLET, FILM COATED ORAL ONCE A DAY
Refills: 0 | Status: ON HOLD | COMMUNITY
Start: 2019-03-03

## 2022-09-01 RX ORDER — HYDROCODONE BITARTRATE AND ACETAMINOPHEN 10; 325 MG/1; MG/1
TABLET ORAL
Qty: 21 | Refills: 0 | Status: ON HOLD | COMMUNITY
Start: 2019-04-12

## 2022-09-01 RX ORDER — ASPIRIN 81 MG/1
1 TABLET TABLET ORAL ONCE A DAY
Status: ON HOLD | COMMUNITY

## 2022-09-01 RX ORDER — ACETAMINOPHEN 325 MG/1
1 TABLET AS NEEDED TABLET ORAL
Status: ACTIVE | COMMUNITY

## 2022-09-01 RX ORDER — AMLODIPINE BESYLATE 5 MG/1
1 TABLET TABLET ORAL ONCE A DAY
Refills: 0 | Status: ON HOLD | COMMUNITY
Start: 2011-01-10

## 2022-09-01 RX ORDER — ALPRAZOLAM 0.5 MG/1
1 TABLET TABLET ORAL ONCE A DAY
Refills: 0 | Status: ON HOLD | COMMUNITY
Start: 2019-03-04

## 2022-09-01 RX ORDER — ALBUTEROL SULFATE 90 UG/1
1 PUFF AS NEEDED AEROSOL, METERED RESPIRATORY (INHALATION)
Status: ACTIVE | COMMUNITY

## 2022-09-01 RX ORDER — METOPROLOL SUCCINATE 50 MG/1
TAKE 1 TABLET DAILY TABLET, EXTENDED RELEASE ORAL
Refills: 0 | Status: ON HOLD | COMMUNITY

## 2022-09-01 RX ORDER — HYDROCHLOROTHIAZIDE 25 MG/1
TAKE 1 TABLET DAILY TABLET ORAL
Refills: 0 | Status: ON HOLD | COMMUNITY
Start: 2020-06-12

## 2022-09-01 RX ORDER — PRAVASTATIN SODIUM 20 MG/1
1 TABLET TABLET ORAL ONCE A DAY
Status: ON HOLD | COMMUNITY

## 2022-09-01 RX ORDER — CETIRIZINE HYDROCHLORIDE 10 MG/1
1 TABLET TABLET, FILM COATED ORAL ONCE A DAY
Status: ACTIVE | COMMUNITY

## 2022-09-01 RX ORDER — ORAL SEMAGLUTIDE 7 MG/1
1 TABLET AT LEAST 30 MINUTES BEFORE FIRST FOOD, BEVERAGE OR OTHER ORAL MEDICINE OF THE DAY TABLET ORAL ONCE A DAY
Status: ACTIVE | COMMUNITY

## 2022-09-01 RX ORDER — PANTOPRAZOLE SODIUM 40 MG/1
1 TABLET TABLET, DELAYED RELEASE ORAL TWICE A DAY
Status: ON HOLD | COMMUNITY

## 2022-09-01 RX ORDER — METFORMIN HYDROCHLORIDE 500 MG/1
1 TABLET WITH A MEAL TABLET, FILM COATED ORAL TWICE DAILY
Status: ON HOLD | COMMUNITY

## 2022-09-01 RX ORDER — WARFARIN SODIUM 10 MG/1
1 TABLET TABLET ORAL ONCE A DAY
Status: ON HOLD | COMMUNITY

## 2022-09-01 RX ORDER — WARFARIN 5 MG/1
TABLET ORAL
Qty: 60 | Refills: 0 | Status: ON HOLD | COMMUNITY
Start: 2010-09-03

## 2022-09-01 RX ORDER — PIOGLITAZONE 15 MG/1
TABLET ORAL
Qty: 90 | Refills: 0 | Status: ON HOLD | COMMUNITY
Start: 2019-03-04

## 2022-09-01 RX ORDER — DICYCLOMINE HYDROCHLORIDE 20 MG/1
TAKE 1 TABLET BY MOUTH THREE TIMES DAILY AS NEEDED TABLET ORAL
Qty: 270 TABLET | Refills: 0 | Status: ACTIVE | COMMUNITY

## 2022-09-01 RX ORDER — AZITHROMYCIN DIHYDRATE 250 MG/1
2 TABLET  ON THE FIRST DAY, THEN 1 TABLET DAILY FOR 4 DAYS TABLET, FILM COATED ORAL ONCE A DAY
Refills: 0 | Status: ON HOLD | COMMUNITY
Start: 2020-04-05

## 2022-09-01 RX ORDER — ESOMEPRAZOLE MAGNESIUM 40 MG/1
1 CAPSULE CAPSULE, DELAYED RELEASE ORAL ONCE A DAY
Status: ACTIVE | COMMUNITY

## 2022-09-02 ENCOUNTER — TELEPHONE ENCOUNTER (OUTPATIENT)
Dept: URBAN - METROPOLITAN AREA CLINIC 113 | Facility: CLINIC | Age: 77
End: 2022-09-02

## 2022-09-29 ENCOUNTER — OFFICE VISIT (OUTPATIENT)
Dept: URBAN - METROPOLITAN AREA CLINIC 113 | Facility: CLINIC | Age: 77
End: 2022-09-29

## 2022-10-27 ENCOUNTER — OFFICE VISIT (OUTPATIENT)
Dept: URBAN - METROPOLITAN AREA CLINIC 113 | Facility: CLINIC | Age: 77
End: 2022-10-27

## 2022-12-20 ENCOUNTER — OFFICE VISIT (OUTPATIENT)
Dept: URBAN - METROPOLITAN AREA CLINIC 113 | Facility: CLINIC | Age: 77
End: 2022-12-20
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
## 2023-12-07 ENCOUNTER — TELEPHONE ENCOUNTER (OUTPATIENT)
Dept: URBAN - METROPOLITAN AREA CLINIC 113 | Facility: CLINIC | Age: 78
End: 2023-12-07

## 2023-12-14 ENCOUNTER — OFFICE VISIT (OUTPATIENT)
Dept: URBAN - METROPOLITAN AREA CLINIC 113 | Facility: CLINIC | Age: 78
End: 2023-12-14
Payer: COMMERCIAL

## 2023-12-14 VITALS
HEIGHT: 67 IN | DIASTOLIC BLOOD PRESSURE: 73 MMHG | SYSTOLIC BLOOD PRESSURE: 157 MMHG | TEMPERATURE: 97.3 F | BODY MASS INDEX: 27.47 KG/M2 | HEART RATE: 71 BPM | RESPIRATION RATE: 18 BRPM | WEIGHT: 175 LBS

## 2023-12-14 DIAGNOSIS — D50.0 IRON DEFICIENCY ANEMIA DUE TO CHRONIC BLOOD LOSS: ICD-10-CM

## 2023-12-14 DIAGNOSIS — K55.20 AVM (ARTERIOVENOUS MALFORMATION) OF SMALL BOWEL, ACQUIRED: ICD-10-CM

## 2023-12-14 DIAGNOSIS — R10.10 UPPER ABDOMINAL PAIN: ICD-10-CM

## 2023-12-14 DIAGNOSIS — K57.90 DIVERTICULOSIS: ICD-10-CM

## 2023-12-14 DIAGNOSIS — K59.01 CONSTIPATION BY DELAYED COLONIC TRANSIT: ICD-10-CM

## 2023-12-14 DIAGNOSIS — R63.4 WEIGHT LOSS: ICD-10-CM

## 2023-12-14 PROCEDURE — 99214 OFFICE O/P EST MOD 30 MIN: CPT | Performed by: INTERNAL MEDICINE

## 2023-12-14 RX ORDER — PIOGLITAZONE 15 MG/1
TABLET ORAL
Qty: 90 | Refills: 0 | Status: ON HOLD | COMMUNITY
Start: 2019-03-04

## 2023-12-14 RX ORDER — LOSARTAN POTASSIUM 50 MG/1
1 TABLET TABLET, FILM COATED ORAL ONCE A DAY
Status: ACTIVE | COMMUNITY
Start: 2023-12-14

## 2023-12-14 RX ORDER — PRAVASTATIN SODIUM 20 MG/1
1 TABLET TABLET ORAL ONCE A DAY
Status: ON HOLD | COMMUNITY

## 2023-12-14 RX ORDER — AZITHROMYCIN DIHYDRATE 250 MG/1
2 TABLET  ON THE FIRST DAY, THEN 1 TABLET DAILY FOR 4 DAYS TABLET, FILM COATED ORAL ONCE A DAY
Refills: 0 | Status: ON HOLD | COMMUNITY
Start: 2020-04-05

## 2023-12-14 RX ORDER — WARFARIN 5 MG/1
TABLET ORAL
Qty: 60 | Refills: 0 | Status: ON HOLD | COMMUNITY
Start: 2010-09-03

## 2023-12-14 RX ORDER — GLIPIZIDE 5 MG/1
1 TABLET 30 MINUTES BEFORE BREAKFAST TABLET ORAL ONCE A DAY
Status: ACTIVE | COMMUNITY
Start: 2023-12-14

## 2023-12-14 RX ORDER — PRAVASTATIN SODIUM 20 MG/1
1 TABLET TABLET ORAL ONCE A DAY
Status: ACTIVE | COMMUNITY
Start: 2023-12-14

## 2023-12-14 RX ORDER — ALBUTEROL SULFATE 90 UG/1
1 PUFF AS NEEDED AEROSOL, METERED RESPIRATORY (INHALATION)
Status: DISCONTINUED | COMMUNITY

## 2023-12-14 RX ORDER — ACETAMINOPHEN 325 MG/1
1 TABLET AS NEEDED TABLET ORAL
Status: DISCONTINUED | COMMUNITY

## 2023-12-14 RX ORDER — PREGABALIN 25 MG/1
1 CAPSULE CAPSULE ORAL ONCE A DAY
Status: ACTIVE | COMMUNITY
Start: 2023-12-14

## 2023-12-14 RX ORDER — ASPIRIN 81 MG/1
1 TABLET TABLET ORAL ONCE A DAY
Status: ON HOLD | COMMUNITY

## 2023-12-14 RX ORDER — FUROSEMIDE 40 MG/1
1 TABLET TABLET ORAL ONCE A DAY
Status: ACTIVE | COMMUNITY
Start: 2023-12-14

## 2023-12-14 RX ORDER — ORAL SEMAGLUTIDE 7 MG/1
1 TABLET AT LEAST 30 MINUTES BEFORE FIRST FOOD, BEVERAGE OR OTHER ORAL MEDICINE OF THE DAY TABLET ORAL ONCE A DAY
Status: DISCONTINUED | COMMUNITY

## 2023-12-14 RX ORDER — PROMETHAZINE HYDROCHLORIDE 25 MG/1
1 TABLET AS NEEDED TABLET ORAL EVERY 6 HOURS
Status: ACTIVE | COMMUNITY
Start: 2023-12-14

## 2023-12-14 RX ORDER — PANTOPRAZOLE SODIUM 40 MG/1
1 TABLET TABLET, DELAYED RELEASE ORAL ONCE A DAY
Status: ACTIVE | COMMUNITY
Start: 2023-12-14

## 2023-12-14 RX ORDER — METFORMIN HYDROCHLORIDE 500 MG/1
1 TABLET WITH A MEAL TABLET, FILM COATED ORAL TWICE DAILY
Status: ON HOLD | COMMUNITY

## 2023-12-14 RX ORDER — CIPROFLOXACIN HYDROCHLORIDE 500 MG/1
1 TABLET TABLET, FILM COATED ORAL
Status: ACTIVE | COMMUNITY
Start: 2023-12-14

## 2023-12-14 RX ORDER — ESOMEPRAZOLE MAGNESIUM 40 MG/1
1 CAPSULE CAPSULE, DELAYED RELEASE ORAL ONCE A DAY
Status: DISCONTINUED | COMMUNITY

## 2023-12-14 RX ORDER — DICYCLOMINE HYDROCHLORIDE 20 MG/1
TAKE 1 TABLET BY MOUTH THREE TIMES DAILY AS NEEDED TABLET ORAL
Qty: 270 TABLET | Refills: 0 | Status: DISCONTINUED | COMMUNITY

## 2023-12-14 RX ORDER — CETIRIZINE HYDROCHLORIDE 10 MG/1
1 TABLET TABLET, FILM COATED ORAL ONCE A DAY
Status: DISCONTINUED | COMMUNITY

## 2023-12-14 RX ORDER — ALPRAZOLAM 0.5 MG/1
1 TABLET TABLET ORAL TWICE A DAY
Status: ACTIVE | COMMUNITY
Start: 2023-12-14

## 2023-12-14 RX ORDER — HYDROCODONE BITARTRATE AND ACETAMINOPHEN 10; 325 MG/1; MG/1
TABLET ORAL
Qty: 21 | Refills: 0 | Status: ON HOLD | COMMUNITY
Start: 2019-04-12

## 2023-12-14 RX ORDER — OXYCODONE HYDROCHLORIDE 5 MG/1
1 TABLET AS NEEDED TABLET ORAL
Status: ACTIVE | COMMUNITY
Start: 2023-12-14

## 2023-12-14 RX ORDER — SIMETHICONE 80 MG/1
1 TABLET AFTER MEALS AND AT BEDTIME AS NEEDED TABLET, CHEWABLE ORAL
Status: DISCONTINUED | COMMUNITY

## 2023-12-14 RX ORDER — TRAMADOL HYDROCHLORIDE 50 MG/1
1 TABLET AS NEEDED TABLET, FILM COATED ORAL EVERY 4 HOURS
Status: ACTIVE | COMMUNITY
Start: 2023-12-14

## 2023-12-14 RX ORDER — WARFARIN SODIUM 5 MG/1
1 TABLET TABLET ORAL ONCE A DAY
Status: ACTIVE | COMMUNITY
Start: 2023-12-14

## 2023-12-14 RX ORDER — WARFARIN SODIUM 10 MG/1
1 TABLET TABLET ORAL ONCE A DAY
Status: ON HOLD | COMMUNITY

## 2023-12-14 RX ORDER — SERTRALINE 50 MG/1
1 TABLET TABLET, FILM COATED ORAL ONCE A DAY
Status: ACTIVE | COMMUNITY
Start: 2023-12-14

## 2023-12-14 RX ORDER — NIFEDIPINE 10 MG/1
3 CAPSULES CAPSULE, LIQUID FILLED ORAL ONCE DAILY
Status: ACTIVE | COMMUNITY
Start: 2023-12-14

## 2023-12-14 RX ORDER — PANTOPRAZOLE SODIUM 40 MG/1
1 TABLET TABLET, DELAYED RELEASE ORAL TWICE A DAY
Status: ON HOLD | COMMUNITY

## 2023-12-14 RX ORDER — ALPRAZOLAM 0.5 MG/1
1 TABLET TABLET ORAL ONCE A DAY
Refills: 0 | Status: ON HOLD | COMMUNITY
Start: 2019-03-04

## 2023-12-14 RX ORDER — TIZANIDINE 4 MG/1
TAKE 1-2 TABLET BY MOUTH AS NEEDED FOR CRAMPS/ PAIN MAX TWICE DAILY TABLET ORAL
Qty: 60 | Refills: 0 | Status: ON HOLD | COMMUNITY
Start: 2019-11-25

## 2023-12-14 RX ORDER — AMITRIPTYLINE HYDROCHLORIDE 25 MG/1
1 TABLET AT BEDTIME TABLET, FILM COATED ORAL ONCE A DAY
Qty: 30 TABLET | Refills: 4 | OUTPATIENT
Start: 2023-12-14

## 2023-12-14 RX ORDER — METOPROLOL SUCCINATE 50 MG/1
TAKE 1 TABLET DAILY TABLET, EXTENDED RELEASE ORAL
Refills: 0 | Status: ON HOLD | COMMUNITY

## 2023-12-14 RX ORDER — AMLODIPINE BESYLATE 5 MG/1
1 TABLET TABLET ORAL ONCE A DAY
Refills: 0 | Status: ON HOLD | COMMUNITY
Start: 2011-01-10

## 2023-12-14 RX ORDER — LOSARTAN POTASSIUM 50 MG/1
1 TABLET TABLET, FILM COATED ORAL ONCE A DAY
Refills: 0 | Status: ON HOLD | COMMUNITY
Start: 2019-03-03

## 2023-12-14 RX ORDER — HYDROCHLOROTHIAZIDE 25 MG/1
TAKE 1 TABLET DAILY TABLET ORAL
Refills: 0 | Status: ON HOLD | COMMUNITY
Start: 2020-06-12
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
## 2023-12-14 NOTE — EXAM-PHYSICAL EXAM
She is alert and oriented to person place and situation no acute distress.  There is no scleral icterus. Abdomen is soft nondistended with diffuse tenderness in the right upper quadrant.  No rebound tenderness.  No mass.  No significant hernia.

## 2023-12-15 LAB
A/G RATIO: 1.1
ABSOLUTE BASOPHILS: 31
ABSOLUTE EOSINOPHILS: 62
ABSOLUTE LYMPHOCYTES: 1538
ABSOLUTE MONOCYTES: 477
ABSOLUTE NEUTROPHILS: 4092
ALBUMIN: 4
ALKALINE PHOSPHATASE: 164
ALT (SGPT): 14
AMYLASE: 97
AST (SGOT): 19
BASOPHILS: 0.5
BILIRUBIN, TOTAL: 0.4
BUN/CREATININE RATIO: 20
BUN: 29
C-REACTIVE PROTEIN, QUANT: 35.9
CALCIUM: 9.6
CARBON DIOXIDE, TOTAL: 25
CHLORIDE: 105
CREATININE: 1.47
EGFR: 37
EOSINOPHILS: 1
GLOBULIN, TOTAL: 3.8
GLUCOSE: 132
HEMATOCRIT: 40.6
HEMOGLOBIN: 12.3
LIPASE: 130
LYMPHOCYTES: 24.8
MCH: 23.7
MCHC: 30.3
MCV: 78.2
MONOCYTES: 7.7
MPV: 11.9
NEUTROPHILS: 66
PLATELET COUNT: 230
POTASSIUM: 4
PROTEIN, TOTAL: 7.8
RDW: 15.2
RED BLOOD CELL COUNT: 5.19
SED RATE BY MODIFIED: 89
SODIUM: 140
WHITE BLOOD CELL COUNT: 6.2

## 2023-12-16 ENCOUNTER — TELEPHONE ENCOUNTER (OUTPATIENT)
Dept: URBAN - METROPOLITAN AREA CLINIC 113 | Facility: CLINIC | Age: 78
End: 2023-12-16

## 2023-12-16 ENCOUNTER — LAB OUTSIDE AN ENCOUNTER (OUTPATIENT)
Dept: URBAN - METROPOLITAN AREA CLINIC 113 | Facility: CLINIC | Age: 78
End: 2023-12-16

## 2023-12-19 ENCOUNTER — TELEPHONE ENCOUNTER (OUTPATIENT)
Dept: URBAN - METROPOLITAN AREA CLINIC 113 | Facility: CLINIC | Age: 78
End: 2023-12-19

## 2024-01-02 ENCOUNTER — TELEPHONE ENCOUNTER (OUTPATIENT)
Dept: URBAN - METROPOLITAN AREA CLINIC 113 | Facility: CLINIC | Age: 79
End: 2024-01-02

## 2024-01-12 ENCOUNTER — DASHBOARD ENCOUNTERS (OUTPATIENT)
Age: 79
End: 2024-01-12

## 2024-01-12 ENCOUNTER — OFFICE VISIT (OUTPATIENT)
Dept: URBAN - METROPOLITAN AREA CLINIC 113 | Facility: CLINIC | Age: 79
End: 2024-01-12
Payer: COMMERCIAL

## 2024-01-12 ENCOUNTER — OFFICE VISIT (OUTPATIENT)
Dept: URBAN - METROPOLITAN AREA CLINIC 107 | Facility: CLINIC | Age: 79
End: 2024-01-12

## 2024-01-12 VITALS
WEIGHT: 183 LBS | HEIGHT: 67 IN | BODY MASS INDEX: 28.72 KG/M2 | SYSTOLIC BLOOD PRESSURE: 125 MMHG | RESPIRATION RATE: 18 BRPM | TEMPERATURE: 96.6 F | HEART RATE: 61 BPM | DIASTOLIC BLOOD PRESSURE: 67 MMHG

## 2024-01-12 DIAGNOSIS — K59.01 CONSTIPATION BY DELAYED COLONIC TRANSIT: ICD-10-CM

## 2024-01-12 DIAGNOSIS — D50.0 IRON DEFICIENCY ANEMIA DUE TO CHRONIC BLOOD LOSS: ICD-10-CM

## 2024-01-12 DIAGNOSIS — R10.10 UPPER ABDOMINAL PAIN: ICD-10-CM

## 2024-01-12 DIAGNOSIS — R74.8 ELEVATED ALKALINE PHOSPHATASE LEVEL: ICD-10-CM

## 2024-01-12 PROBLEM — 428283002: Status: ACTIVE | Noted: 2024-01-12

## 2024-01-12 PROCEDURE — 99214 OFFICE O/P EST MOD 30 MIN: CPT | Performed by: INTERNAL MEDICINE

## 2024-01-12 RX ORDER — NIFEDIPINE 10 MG/1
3 CAPSULES CAPSULE, LIQUID FILLED ORAL ONCE DAILY
Status: ACTIVE | COMMUNITY
Start: 2023-12-14

## 2024-01-12 RX ORDER — HYDROCODONE BITARTRATE AND ACETAMINOPHEN 10; 325 MG/1; MG/1
TABLET ORAL
Qty: 21 | Refills: 0 | Status: ON HOLD | COMMUNITY
Start: 2019-04-12

## 2024-01-12 RX ORDER — LOSARTAN POTASSIUM 50 MG/1
1 TABLET TABLET, FILM COATED ORAL ONCE A DAY
Refills: 0 | Status: ON HOLD | COMMUNITY
Start: 2019-03-03

## 2024-01-12 RX ORDER — WARFARIN SODIUM 5 MG/1
1 TABLET TABLET ORAL ONCE A DAY
Status: ACTIVE | COMMUNITY
Start: 2023-12-14

## 2024-01-12 RX ORDER — PANTOPRAZOLE SODIUM 40 MG/1
1 TABLET TABLET, DELAYED RELEASE ORAL ONCE A DAY
Status: ACTIVE | COMMUNITY
Start: 2023-12-14

## 2024-01-12 RX ORDER — AZITHROMYCIN DIHYDRATE 250 MG/1
2 TABLET  ON THE FIRST DAY, THEN 1 TABLET DAILY FOR 4 DAYS TABLET, FILM COATED ORAL ONCE A DAY
Refills: 0 | Status: ON HOLD | COMMUNITY
Start: 2020-04-05

## 2024-01-12 RX ORDER — ALPRAZOLAM 0.5 MG/1
1 TABLET TABLET ORAL ONCE A DAY
Refills: 0 | Status: ON HOLD | COMMUNITY
Start: 2019-03-04

## 2024-01-12 RX ORDER — PRAVASTATIN SODIUM 20 MG/1
1 TABLET TABLET ORAL ONCE A DAY
Status: ON HOLD | COMMUNITY

## 2024-01-12 RX ORDER — GLIPIZIDE 5 MG/1
1 TABLET 30 MINUTES BEFORE BREAKFAST TABLET ORAL ONCE A DAY
Status: ACTIVE | COMMUNITY
Start: 2023-12-14

## 2024-01-12 RX ORDER — FUROSEMIDE 40 MG/1
1 TABLET TABLET ORAL ONCE A DAY
Status: ACTIVE | COMMUNITY
Start: 2023-12-14

## 2024-01-12 RX ORDER — METOPROLOL SUCCINATE 50 MG/1
TAKE 1 TABLET DAILY TABLET, EXTENDED RELEASE ORAL
Refills: 0 | Status: ON HOLD | COMMUNITY

## 2024-01-12 RX ORDER — WARFARIN SODIUM 10 MG/1
1 TABLET TABLET ORAL ONCE A DAY
Status: ON HOLD | COMMUNITY

## 2024-01-12 RX ORDER — PANTOPRAZOLE SODIUM 40 MG/1
1 TABLET TABLET, DELAYED RELEASE ORAL TWICE A DAY
Status: ON HOLD | COMMUNITY

## 2024-01-12 RX ORDER — AMLODIPINE BESYLATE 5 MG/1
1 TABLET TABLET ORAL ONCE A DAY
Refills: 0 | Status: ON HOLD | COMMUNITY
Start: 2011-01-10

## 2024-01-12 RX ORDER — TIZANIDINE 4 MG/1
TAKE 1-2 TABLET BY MOUTH AS NEEDED FOR CRAMPS/ PAIN MAX TWICE DAILY TABLET ORAL
Qty: 60 | Refills: 0 | Status: ON HOLD | COMMUNITY
Start: 2019-11-25

## 2024-01-12 RX ORDER — WARFARIN 5 MG/1
TABLET ORAL
Qty: 60 | Refills: 0 | Status: ON HOLD | COMMUNITY
Start: 2010-09-03

## 2024-01-12 RX ORDER — CIPROFLOXACIN HYDROCHLORIDE 500 MG/1
1 TABLET TABLET, FILM COATED ORAL
Status: ACTIVE | COMMUNITY
Start: 2023-12-14

## 2024-01-12 RX ORDER — PREGABALIN 25 MG/1
1 CAPSULE CAPSULE ORAL ONCE A DAY
Status: ACTIVE | COMMUNITY
Start: 2023-12-14

## 2024-01-12 RX ORDER — OXYCODONE HYDROCHLORIDE 5 MG/1
1 TABLET AS NEEDED TABLET ORAL
Status: ACTIVE | COMMUNITY
Start: 2023-12-14

## 2024-01-12 RX ORDER — ALPRAZOLAM 0.5 MG/1
1 TABLET TABLET ORAL TWICE A DAY
Status: ACTIVE | COMMUNITY
Start: 2023-12-14

## 2024-01-12 RX ORDER — PIOGLITAZONE 15 MG/1
TABLET ORAL
Qty: 90 | Refills: 0 | Status: ON HOLD | COMMUNITY
Start: 2019-03-04

## 2024-01-12 RX ORDER — SERTRALINE 50 MG/1
1 TABLET TABLET, FILM COATED ORAL ONCE A DAY
Status: ACTIVE | COMMUNITY
Start: 2023-12-14

## 2024-01-12 RX ORDER — HYDROCHLOROTHIAZIDE 25 MG/1
TAKE 1 TABLET DAILY TABLET ORAL
Refills: 0 | Status: ON HOLD | COMMUNITY
Start: 2020-06-12

## 2024-01-12 RX ORDER — LOSARTAN POTASSIUM 50 MG/1
1 TABLET TABLET, FILM COATED ORAL ONCE A DAY
Status: ACTIVE | COMMUNITY
Start: 2023-12-14

## 2024-01-12 RX ORDER — TRAMADOL HYDROCHLORIDE 50 MG/1
1 TABLET AS NEEDED TABLET, FILM COATED ORAL EVERY 4 HOURS
Status: ACTIVE | COMMUNITY
Start: 2023-12-14

## 2024-01-12 RX ORDER — AMITRIPTYLINE HYDROCHLORIDE 25 MG/1
1 TABLET AT BEDTIME TABLET, FILM COATED ORAL ONCE A DAY
Qty: 30 TABLET | Refills: 4 | Status: ACTIVE | COMMUNITY
Start: 2023-12-14

## 2024-01-12 RX ORDER — PROMETHAZINE HYDROCHLORIDE 25 MG/1
1 TABLET AS NEEDED TABLET ORAL EVERY 6 HOURS
Status: ACTIVE | COMMUNITY
Start: 2023-12-14

## 2024-01-12 RX ORDER — PRAVASTATIN SODIUM 20 MG/1
1 TABLET TABLET ORAL ONCE A DAY
Status: ACTIVE | COMMUNITY
Start: 2023-12-14

## 2024-01-12 RX ORDER — METFORMIN HYDROCHLORIDE 500 MG/1
1 TABLET WITH A MEAL TABLET, FILM COATED ORAL TWICE DAILY
Status: ON HOLD | COMMUNITY

## 2024-01-12 RX ORDER — ASPIRIN 81 MG/1
1 TABLET TABLET ORAL ONCE A DAY
Status: ON HOLD | COMMUNITY
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
## 2024-01-12 NOTE — EXAM-PHYSICAL EXAM
She is alert and oriented to person place and situation no acute distress.  There is no scleral icterus. Abdomen is soft with tenderness over the right lower rib cage and upper quadrant.
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
## 2024-01-17 ENCOUNTER — TELEPHONE ENCOUNTER (OUTPATIENT)
Dept: URBAN - METROPOLITAN AREA CLINIC 113 | Facility: CLINIC | Age: 79
End: 2024-01-17

## 2024-01-22 ENCOUNTER — TELEPHONE ENCOUNTER (OUTPATIENT)
Dept: URBAN - METROPOLITAN AREA CLINIC 113 | Facility: CLINIC | Age: 79
End: 2024-01-22

## 2024-01-25 ENCOUNTER — OFFICE VISIT (OUTPATIENT)
Dept: URBAN - METROPOLITAN AREA CLINIC 35 | Facility: CLINIC | Age: 79
End: 2024-01-25

## 2024-02-19 ENCOUNTER — OV EP (OUTPATIENT)
Dept: URBAN - METROPOLITAN AREA CLINIC 113 | Facility: CLINIC | Age: 79
End: 2024-02-19

## 2024-04-02 ENCOUNTER — OV NP (OUTPATIENT)
Dept: URBAN - METROPOLITAN AREA CLINIC 35 | Facility: CLINIC | Age: 79
End: 2024-04-02